# Patient Record
Sex: FEMALE | Race: WHITE | Employment: UNEMPLOYED | URBAN - METROPOLITAN AREA
[De-identification: names, ages, dates, MRNs, and addresses within clinical notes are randomized per-mention and may not be internally consistent; named-entity substitution may affect disease eponyms.]

---

## 2017-08-24 ENCOUNTER — OFFICE VISIT (OUTPATIENT)
Dept: ORTHOPEDIC SURGERY | Age: 47
End: 2017-08-24

## 2017-08-24 VITALS
BODY MASS INDEX: 25.18 KG/M2 | HEIGHT: 69 IN | WEIGHT: 170 LBS | SYSTOLIC BLOOD PRESSURE: 134 MMHG | HEART RATE: 60 BPM | DIASTOLIC BLOOD PRESSURE: 83 MMHG

## 2017-08-24 DIAGNOSIS — M25.562 LEFT KNEE PAIN, UNSPECIFIED CHRONICITY: Primary | ICD-10-CM

## 2017-08-24 DIAGNOSIS — M17.12 PRIMARY LOCALIZED OSTEOARTHROSIS, LOWER LEG, LEFT: ICD-10-CM

## 2017-08-24 DIAGNOSIS — M25.552 LEFT HIP PAIN: ICD-10-CM

## 2017-08-24 PROCEDURE — 73564 X-RAY EXAM KNEE 4 OR MORE: CPT | Performed by: ORTHOPAEDIC SURGERY

## 2017-08-24 PROCEDURE — 99214 OFFICE O/P EST MOD 30 MIN: CPT | Performed by: ORTHOPAEDIC SURGERY

## 2017-08-24 PROCEDURE — 73502 X-RAY EXAM HIP UNI 2-3 VIEWS: CPT | Performed by: ORTHOPAEDIC SURGERY

## 2020-08-14 ENCOUNTER — OFFICE VISIT (OUTPATIENT)
Dept: ORTHOPEDIC SURGERY | Age: 50
End: 2020-08-14
Payer: COMMERCIAL

## 2020-08-14 VITALS — WEIGHT: 170 LBS | BODY MASS INDEX: 25.18 KG/M2 | HEIGHT: 69 IN

## 2020-08-14 PROCEDURE — L1830 KO IMMOB CANVAS LONG PRE OTS: HCPCS | Performed by: ORTHOPAEDIC SURGERY

## 2020-08-14 PROCEDURE — 99214 OFFICE O/P EST MOD 30 MIN: CPT | Performed by: ORTHOPAEDIC SURGERY

## 2020-08-14 NOTE — LETTER
Attention    These are medical screening labs, not pre-admission surgery labs. Via Tawanda Henriquez M.D.  [phone]  [address], [address] [facility]    Date:  2020    Name: Maryjane Mckee    : 1970    Please take this form with you.       THE FOLLOWING LABS NEED TO BE COMPLETED:    _x__UA  _x__URINE C/S (THIS NEEDS TO BE DONE EVEN IF THE UA IS NORMAL)  _x__CBC W/ DIFF  _x__PT/INR  _x__PTT  _x__TRANSFERRIN  _x__ALBUMIN  _x__CHEM 7  _x__HEMAGLOBIN A1-C  ____OTHER: _____________________________________________                         Diagnosis:  LT KNEE OSTEOARTHRITIS            RT KNEE OA M17.11      LT KNEE OA M17.12         RT HIP OA  M16.11         LT HIP OA M16.12         RT SHLD OA  M19.011   LT SHLD OA  M19.012             Z01.812  (Pre-op examination code)      2020 11:57 AM  Sharlene Quinn PA-C

## 2020-08-14 NOTE — PROGRESS NOTES
Grandfather      Social History     Socioeconomic History    Marital status:      Spouse name: None    Number of children: None    Years of education: None    Highest education level: None   Occupational History    None   Social Needs    Financial resource strain: None    Food insecurity     Worry: None     Inability: None    Transportation needs     Medical: None     Non-medical: None   Tobacco Use    Smoking status: Former Smoker     Packs/day: 1.00    Smokeless tobacco: Never Used   Substance and Sexual Activity    Alcohol use: Yes     Alcohol/week: 14.0 standard drinks     Types: 14 Glasses of wine per week     Comment: 2 glasses  wine per day    Drug use: No    Sexual activity: None   Lifestyle    Physical activity     Days per week: None     Minutes per session: None    Stress: None   Relationships    Social connections     Talks on phone: None     Gets together: None     Attends Anabaptism service: None     Active member of club or organization: None     Attends meetings of clubs or organizations: None     Relationship status: None    Intimate partner violence     Fear of current or ex partner: None     Emotionally abused: None     Physically abused: None     Forced sexual activity: None   Other Topics Concern    None   Social History Narrative    None     Current Outpatient Medications   Medication Sig Dispense Refill    amphetamine-dextroamphetamine (ADDERALL, 15MG,) 15 MG tablet Take 15 mg by mouth 2 times daily.  sertraline (ZOLOFT) 50 MG tablet Take 50 mg by mouth daily. No current facility-administered medications for this visit. Medication Management  Patient has been treated with NSAIDs and Steroids with Minimal relief for 3 years. Review of Systems:  Relevant review of systems reviewed and available in the patient's chart    Vital Signs: There were no vitals filed for this visit. Body mass index is 25.1 kg/m².     Limitation in Activities of Daily Living (ADLs)  The patient is able to ambulate with the assistance of cane for 6 - 10 steps  steps/feet. Walking distance less than 1 block    Patient needs assistance with activities of daily living bathing, cooking and work. [facility]: No     Safety Issues: Risk of falls  Patient is at risk for falls/fall history: Yes    General Exam:   Constitutional: Patient is adequately groomed with no evidence of malnutrition  DTRs: Deep tendon reflexes are intact  Mental Status: The patient is oriented to time, place and person. The patient's mood and affect are appropriate. Lymphatic: The lymphatic examination bilaterally reveals all areas to be without enlargement or induration. Vascular: Examination reveals no swelling or calf tenderness. Peripheral pulses are palpable and 2+. Neurological: The patient has good coordination. There is no weakness or sensory deficit. Left knee Examination:    Inspection:  No erythema or signs of infection. Positive knee effusion. There are no cutaneous lesions    Palpation:  There is tenderness to palpation along the lateral joint line. Range of Motion: 5 extension to 100 of active flexion. Crepitation present throughout range of motion    Strength:  4+/5 quadriceps and hamstrings    Special Tests: The knee is stable to varus valgus stressing/anterior posterior drawer. Negative Homans test.                                 Skin: There are no rashes, ulcerations or lesions. Gait: mildly antalgic favoring the right side    Reflex 2+ patellar    Additional Comments:     Examination of the right knee reveals intact skin. There is no focal tenderness. The patient demonstrates full painless range of motion with regard to flexion and extension. Strength is 5/5 throughout all planes. Ligamentous stability is grossly intact. Examination of the right and left hip reveals intact skin.  The patient demonstrates full painless range of motion with regards to flexion, abduction, internal and external rotation. There is no tenderness about the greater trochanter. There is a negative straight leg raise against resistance. Strength is 5/5 throughout all planes. Radiology:   X-rays obtained and reviewed in office:  Views 4  Location AP flexed knee lateral and sunrise views of the left knee:    Impression:   There is advanced lateral and patellofemoral joint space thinning with sclerosis and osteophyte formation present. The medial joint space with mild thinning and osteophyte formation  No fractures are identified. Failed Outpatient management or Previous Surgical Intervention  Patient has undergone conservative treatment of left knee for the past 3 years. Patient has undergone therapy consisting of at least 3 months of physical therapy which included muscle strengthening and flexibility program, cortisone injections, Visco supplementation, different oral medications including NSAIDs and analgesics, efforts at weight loss, and activity modification years with no improvement in  pain relief or function. Impression:  Encounter Diagnosis   Name Primary?  Left knee pain, unspecified chronicity Yes       Office Procedures:  Orders Placed This Encounter   Procedures    XR KNEE LEFT (MIN 4 VIEWS)     Standing Status:   Future     Number of Occurrences:   1     Standing Expiration Date:   8/14/2021       Treatment Plan:  I discussed with the patient the nature of osteoarthritis of the knee. We talked about treatment of arthritis and the various options that are involved with this. The patient understands that the treatments can vary from essentially doing nothing to a total joint replacement arthroplasty for arthritis. I then went on to describe the utilization of glucosamine and chondroitin sulfate as a joint nutrition product. We talked about the fact that this is essentially a joint vitamin with typically minimal side effects.  We also talked about utilization of prescription over-the-counter anti-inflammatory medications as the next option. We also discussed the possibility of brace wear or orthotic wear if the patient has significant varus alignment. We then went on to discuss the possibility of Visco supplementation with hyaluronate products. We talked about the typical course of this type of treatment and the fact that often times in the treatment for significant arthritis, this is successful less than half the time. We also talked about the corticosteroid injections and the fact that this can give a brief window of relief, but does not cure the problem; in fact, the pain often has a rebound effect in 6-10 weeks after the steroid has worn off. We also discussed arthroscopy surgery in attempts to debride the joint, but the fact that this is relatively unreliable treatment in the face of significant arthritis. It can occasionally be used, particularly if there is significant meniscus pathology. Lastly we discussed total joint replacement arthroplasty as the final and definitive step in treatment of arthritis. Patient realizes the magnitude of this type of treatment as well as having voiced a general understanding to the duration of the prosthesis. The patient voiced understanding to these continuum of treatment options. At this point the patient has failed conservative treatment as mentioned above. They would like to go ahead and proceed with a left total knee replacement with robotic assistance. This does require nondiagnostic CT scan for surgical planning. The patient is aware that this may or may not be covered by the insurance provider and would be an out-of-pocket expense. We discussed the risk, benefits, and potential complications of knee replacement arthroplasty surgery. The patient voiced their understanding to concerns that include infection, deep vein thrombosis, neurological injury, and delayed rehabilitation.  The patient also realizes that there are concerns regarding the potential need for manipulation under anesthesia if range of motion proves to be problematic. The patient also understands that there is always a chance of dystrophy and anesthetic complications that would include a stroke, cardiopulmonary pathology, and even death. We also discussed the rehabilitation process involved with this operation and options that involved not only the hospitalization but outpatient physical therapy and independent home exercise program.  The patient also realizes the need for a knee brace and ambulatory aids in the rehabilitation process as well as the very significant role that the patient plays in terms of rehabilitation after this type of operation. The patient also understands that although the patient typically functional by 6-8 weeks postop that it may take 9 months to year for full recovery. All questions were answered. Patient will participate in preoperative lab work and physical therapy. She will utilize Ortho vitals postoperative monitoring.     Demand matching tool completed-advanced

## 2020-08-14 NOTE — LETTER
practice of surgery and medicine is not an exact science, and I acknowledge that no guarantees have been made to me concerning the results of the procedure(s). 5) I consent to the taking of photographs before, during and after the procedure(s) for scientific and educational purposes. I also understand that medical students and residents may participate in the procedure(s) set forth in Paragraph 1, and I consent to their participation under the supervision of the above named physician. 6) I consent to the administration of anesthesia and to the use of such anesthetics as may be deemed advisable by the anesthesiologist engaged to administer anesthesia. 7) I certify that I have read and understand this consent to the surgical or medical procedure(s); that all the information contained herein was disclosed to me by the informing physician prior to my signing; that all blanks or statements requiring insertions or completion were filled in and inapplicable paragraphs, if any, were stricken before I signed; and that all questions asked by me about the procedure(s) have been fully answered by the informing physician in a satisfactory manner.    ________________________________                           _______________________________  Signature of patient                                                                  Hi Guerrero M.D.  ________________________________                           ________________________________  Signature of Informing Physician                                           Informing Physician (Print)    If patient is unable to sign or is a minor, complete one of the following:   A) Patient is a minor ______________ years of age.    B) Patient is unable to sign because_________________________________________________ the PT Evaluation and completed labs has been determined you will be called and set up for surgery. This may take 1-2 days to check results and return your phone call. You will not be called about lab results if everything is normal.      Please make sure you have not blocked our number. [facility] will call from [phone] / [phone]. Also, please make sure your voice mail is not full.

## 2020-08-18 ENCOUNTER — HOSPITAL ENCOUNTER (OUTPATIENT)
Dept: PHYSICAL THERAPY | Age: 50
Setting detail: THERAPIES SERIES
Discharge: HOME | End: 2020-08-18
Payer: COMMERCIAL

## 2020-08-19 ENCOUNTER — HOSPITAL ENCOUNTER (OUTPATIENT)
Dept: PHYSICAL THERAPY | Age: 50
Setting detail: THERAPIES SERIES
Discharge: HOME | End: 2020-08-19
Payer: COMMERCIAL

## 2020-08-19 PROCEDURE — 97110 THERAPEUTIC EXERCISES: CPT | Performed by: PHYSICAL THERAPIST

## 2020-08-19 PROCEDURE — 97161 PT EVAL LOW COMPLEX 20 MIN: CPT | Performed by: PHYSICAL THERAPIST

## 2020-08-19 NOTE — FLOWSHEET NOTE
flexibility, endurance, ROM for improvements in LE, proximal hip, and core control with self care, mobility, lifting, ambulation.  [] (14250) Provided verbal/tactile cueing for activities related to improving balance, coordination, kinesthetic sense, posture, motor skill, proprioception  to assist with LE, proximal hip, and core control in self care, mobility, lifting, ambulation and eccentric single leg control. NMR and Therapeutic Activities:    [] (90423 or 97773) Provided verbal/tactile cueing for activities related to improving balance, coordination, kinesthetic sense, posture, motor skill, proprioception and motor activation to allow for proper function of core, proximal hip and LE with self care and ADLs  [] (11447) Gait Re-education- Provided training and instruction to the patient for proper LE, core and proximal hip recruitment and positioning and eccentric body weight control with ambulation re-education including up and down stairs     Home Exercise Program:    [x] (40803) Reviewed/Progressed HEP activities related to strengthening, flexibility, endurance, ROM of core, proximal hip and LE for functional self-care, mobility, lifting and ambulation/stair navigation   [] (86406)Reviewed/Progressed HEP activities related to improving balance, coordination, kinesthetic sense, posture, motor skill, proprioception of core, proximal hip and LE for self care, mobility, lifting, and ambulation/stair navigation      Manual Treatments:  PROM / STM / Oscillations-Mobs:  G-I, II, III, IV (PA's, Inf., Post.)  [] (16164) Provided manual therapy to mobilize LE, proximal hip and/or LS spine soft tissue/joints for the purpose of modulating pain, promoting relaxation,  increasing ROM, reducing/eliminating soft tissue swelling/inflammation/restriction, improving soft tissue extensibility and allowing for proper ROM for normal function with self care, mobility, lifting and ambulation.      Modalities:      Charges:  Timed Code Treatment Minutes: 20   Total Treatment Minutes: 40     [x] EVAL (LOW) 59792 (typically 20 minutes face-to-face)  [] EVAL (MOD) 56714 (typically 30 minutes face-to-face)  [] EVAL (HIGH) 97777 (typically 45 minutes face-to-face)  [] RE-EVAL     [x] MP(61589) x  1   [] IONTO  [] NMR (44854) x     [] VASO  [] Manual (44162) x      [] Other:  [] TA x      [] Mech Traction (45526)  [] ES(attended) (31433)      [] ES (un) (54610):     GOALS:   Patient stated goal: To be prepared for surgery      Therapist goals for Patient:    Short Term Goals: To be achieved in: 1 weeks  1. Independent in HEP and progression per patient tolerance, in order to prevent re-injury and to prepare for surgery by strength and flexibility therex . Progression Towards Functional goals:  [] Patient is progressing as expected towards functional goals listed. [] Progression is slowed due to complexities listed. [] Progression has been slowed due to co-morbidities.   [x] Plan just implemented, too soon to assess goals progression  [] Other:     ASSESSMENT:  See eval    Treatment/Activity Tolerance:  [x] Patient tolerated treatment well [] Patient limited by fatique  [] Patient limited by pain  [] Patient limited by other medical complications  [] Other:   [] Patient did not tolerate physical therapy activity due to substantial increase in pain    Prognosis: [x] Good [] Fair  [] Poor          Patient Requires Follow-up: [] Yes  [x] No    PLAN: See eval       [] Continue per plan of care  [] Alter current plan (see comments)  [] Plan of care initiated [x] Discharge    [] Discharge to home program at this time due to inability to tolerate physical therapy activity       Electronically signed by: Kwasi Aguilar, PT

## 2020-08-19 NOTE — PLAN OF CARE
[facility], [address], [address] [place] [address]  Phone: [phone]  Fax [phone]     Physical Therapy Certification    Dear Referring Practitioner: Massiel Holland,    We had the pleasure of evaluating the following patient for physical therapy services at [address] [place] [address]. A summary of our findings can be found in the initial assessment below. This includes our plan of care. If you have any questions or concerns regarding these findings, please do not hesitate to contact me at the office phone number checked above. Thank you for the referral.       Physician Signature:_______________________________Date:__________________  By signing above (or electronic signature), therapists plan is approved by physician    Patient: Krista Tsang   : 1970   MRN: [id number]  Referring Physician: Referring Practitioner: Massiel Holland      Evaluation Date: 2020      Medical Diagnosis Information:  Diagnosis: M25.562 (ICD-10-CM) - Left knee pain, unspecified chronicity,M17.12 (ICD-10-CM) - Primary osteoarthritis of left knee   Treatment Diagnosis: M25.562 (ICD-10-CM) - Left knee pain, unspecified chronicity                                         Insurance information: PT Insurance Information:  [employer] - [id number] - NO AUTH       Precautions/ Contra-indications: none    C-SSRS Triggered by Intake questionnaire (Past 2 wk assessment):   [x] No, Questionnaire did not trigger screening.   [] Yes, Patient intake triggered further evaluation      [] C-SSRS Screening completed  [] PCP notified via Plan of Care  [] Emergency services notified     Latex Allergy:  [x]NO      []YES  Preferred Language for Healthcare:   [x]English       []other:    SUBJECTIVE: Patient stated complaint:Pt reports progressive worsening of L >R knee pain that limits her walking and daily activities, causing her to seek out L TKR.   She is here personal factors that will affect rehab potential:              []Age  []Sex              []Motivation/Lack of Motivation                        []Co-Morbidities              []Cognitive Function, education/learning barriers              []Environmental, home barriers              []profession/work barriers  []past PT/medical experience  []other:  Justification:     Falls Risk Assessment (30 days):   [x] Falls Risk assessed and no intervention required. [] Falls Risk assessed and Patient requires intervention due to being higher risk   TUG score (>12s at risk):     [] Falls education provided, including           ASSESSMENT:   Functional Impairments:     []Noted lumbar/proximal hip/LE joint hypomobility   [x]Decreased LE functional ROM   []Decreased core/proximal hip strength and neuromuscular control   [x]Decreased LE functional strength   []Reduced balance/proprioceptive control   []other:      Functional Activity Limitations (from functional questionnaire and intake)   []Reduced ability to tolerate prolonged functional positions   []Reduced ability or difficulty with changes of positions or transfers between positions   []Reduced ability to maintain good posture and demonstrate good body mechanics with sitting, bending, and lifting   []Reduced ability to sleep   [] Reduced ability or tolerance with driving and/or computer work   [x]Reduced ability to perform lifting, carrying tasks   [x]Reduced ability to squat   []Reduced ability to forward bend   [x]Reduced ability to ambulate prolonged functional periods/distances/surfaces   [x]Reduced ability to ascend/descend stairs   [x]Reduced ability to run, hop, cut or jump   []other:    Participation Restrictions   []Reduced participation in self care activities   [x]Reduced participation in home management activities   []Reduced participation in work activities   [x]Reduced participation in social activities.    [x]Reduced participation in sport/recreation activities. Classification :    []Signs/symptoms consistent with post-surgical status including decreased ROM, strength and function. []Signs/symptoms consistent with joint sprain/strain   []Signs/symptoms consistent with patella-femoral syndrome   [x]Signs/symptoms consistent with knee OA/hip OA   []Signs/symptoms consistent with internal derangement of knee/Hip   []Signs/symptoms consistent with functional hip weakness/NMR control      []Signs/symptoms consistent with tendinitis/tendinosis    []signs/symptoms consistent with pathology which may benefit from Dry needling      []other:      Prognosis/Rehab Potential:      []Excellent   [x]Good    []Fair   []Poor    Tolerance of evaluation/treatment:    []Excellent   [x]Good    []Fair   []Poor  Physical Therapy Evaluation Complexity Justification  [x] A history of present problem with:  [] no personal factors and/or comorbidities that impact the plan of care;  [x]1-2 personal factors and/or comorbidities that impact the plan of care  []3 personal factors and/or comorbidities that impact the plan of care  [x] An examination of body systems using standardized tests and measures addressing any of the following: body structures and functions (impairments), activity limitations, and/or participation restrictions;:  [x] a total of 1-2 or more elements   [] a total of 3 or more elements   [] a total of 4 or more elements   [x] A clinical presentation with:  [x] stable and/or uncomplicated characteristics   [] evolving clinical presentation with changing characteristics  [] unstable and unpredictable characteristics;   [x] Clinical decision making of [x] low, [] moderate, [] high complexity using standardized patient assessment instrument and/or measurable assessment of functional outcome.     [x] EVAL (LOW) 94015 (typically 20 minutes face-to-face)  [] EVAL (MOD) 81713 (typically 30 minutes face-to-face)  [] EVAL (HIGH) 16365 (typically 45 minutes face-to-face)  []

## 2020-09-09 DIAGNOSIS — M25.562 LEFT KNEE PAIN, UNSPECIFIED CHRONICITY: Primary | ICD-10-CM

## 2020-09-10 ENCOUNTER — TELEPHONE (OUTPATIENT)
Dept: ORTHOPEDIC SURGERY | Age: 50
End: 2020-09-10

## 2020-09-10 NOTE — TELEPHONE ENCOUNTER
Spoke to patient concerning her preop lab work. She has not had it done yet. When I called her she expressed an interest in rescheduling her case for just 1-2 weeks from her normal scheduled case on 9/22. She has some social obligations that she needs to take care of.

## 2020-09-16 ENCOUNTER — TELEPHONE (OUTPATIENT)
Dept: ORTHOPEDIC SURGERY | Age: 50
End: 2020-09-16

## 2020-09-16 NOTE — TELEPHONE ENCOUNTER
COVID: 9/24/2020- negative     Orthopedic Nurse Navigator Summary  -  Patient Name: Davina Beauchamp  Anticipated Date of Surgery:9/30/2020  Using [employer]? Yes, Are they Registered: Yes  Attended Pre-Op Education Class: No  If No, why not? PCP:  Phone #:  Date of PCP Visit for H&P: 09/14/2020  Any Noted Concerns from PCP prior to surgery: No  If Yes, what concerns?:  Is the Patient in a Pain Management Program?: No  Review of Past Medical History Reveals History of:  -  Critical Lab Values:  - Hemoglobin (g/dL): Date Value  - Hematocrit (%): Date Value  - HgbA1C : Date Value  - Albumin : Date Value  - BUN (mg/dL) : Date Value  - CREATININE (mg/dL) : Date Value  - BMI (kg/m2) : Date Value 25.0  -  Coronary Artery Disease/HTN/CHF History: No  Cardiologist:  Cardiac Clearance Necessary: No  Date of Cardiac Clearance Appt: On Plavix? No  If YES, when will they stop taking? Final Cardiac Recommendations: On any anticoagulation: No  -  Diabetes History: No  Most Recent HgbA1C:  PCP or Endocrine Recommendations:  Nutritionist/Dietician Consult Scheduled:  Final Plan For Diabetic Control:  Pulmonary: COPD/Emphysema/ Use of home oxygen: NONE  Alcohol use: Casual Drinker  -  DVT Risk Stratification: Low Risk  Vascular Consult Ordered:  Date of Vascular Appt:  Hematology/Oncology Consult Ordered:  Date of Hematology/Oncology Appt:  Final Recommendation For DVT Prophylaxis:  Smoking history: Non-Smoker  Use of Estrogen:  -  BMI Greater than 40 at time of scheduling?: No  Has Surgeon been notified of BMI concern? No  Weight Loss Clinic Consult Ordered No  Date of Wt Loss Clinic Appt:  BMI at time of surgery (if went through [facility]):  -  Additional Medical Concerns:   Additional Recommendations for above concerns:  Attended Pre-Hab Program: Yes  Anticipated Discharge Disposition: Home with OPT  Who will be with patient at home following discharge?   Equipment patient already has: none  Bedroom on first or second floor: first  Bathroom on first or second floor: first  Weight bearing status: full  Pre-op ambulatory status:  Number of entry steps: FIVE  Caregiver assistance: full time  [place] [facility]  09/23/2020

## 2020-09-23 ENCOUNTER — HOSPITAL ENCOUNTER (OUTPATIENT)
Age: 50
Discharge: HOME | End: 2020-09-23
Payer: COMMERCIAL

## 2020-09-23 LAB
ALBUMIN SERPL-MCNC: 4.7 G/DL (ref 3.4–5)
ANION GAP SERPL CALCULATED.3IONS-SCNC: 10 MMOL/L (ref 3–16)
APTT: 28.7 SEC (ref 24.2–36.2)
BASOPHILS ABSOLUTE: 0 K/UL (ref 0–0.2)
BASOPHILS RELATIVE PERCENT: 0.5 %
BILIRUBIN URINE: NEGATIVE
BLOOD, URINE: NEGATIVE
BUN BLDV-MCNC: 18 MG/DL (ref 7–20)
CALCIUM SERPL-MCNC: 9.8 MG/DL (ref 8.3–10.6)
CHLORIDE BLD-SCNC: 98 MMOL/L (ref 99–110)
CLARITY: CLEAR
CO2: 29 MMOL/L (ref 21–32)
COLOR: YELLOW
CREAT SERPL-MCNC: 0.7 MG/DL (ref 0.6–1.1)
EOSINOPHILS ABSOLUTE: 0.1 K/UL (ref 0–0.6)
EOSINOPHILS RELATIVE PERCENT: 1.6 %
GFR AFRICAN AMERICAN: >60
GFR NON-AFRICAN AMERICAN: >60
GLUCOSE BLD-MCNC: 80 MG/DL (ref 70–99)
GLUCOSE URINE: NEGATIVE MG/DL
HCT VFR BLD CALC: 40.8 % (ref 36–48)
HEMOGLOBIN: 14 G/DL (ref 12–16)
INR BLD: 0.91 (ref 0.86–1.14)
KETONES, URINE: NEGATIVE MG/DL
LEUKOCYTE ESTERASE, URINE: NEGATIVE
LYMPHOCYTES ABSOLUTE: 0.8 K/UL (ref 1–5.1)
LYMPHOCYTES RELATIVE PERCENT: 25.4 %
MCH RBC QN AUTO: 33.2 PG (ref 26–34)
MCHC RBC AUTO-ENTMCNC: 34.2 G/DL (ref 31–36)
MCV RBC AUTO: 96.9 FL (ref 80–100)
MICROSCOPIC EXAMINATION: NORMAL
MONOCYTES ABSOLUTE: 0.3 K/UL (ref 0–1.3)
MONOCYTES RELATIVE PERCENT: 9 %
NEUTROPHILS ABSOLUTE: 2 K/UL (ref 1.7–7.7)
NEUTROPHILS RELATIVE PERCENT: 63.5 %
NITRITE, URINE: NEGATIVE
PDW BLD-RTO: 12.6 % (ref 12.4–15.4)
PH UA: 6 (ref 5–8)
PLATELET # BLD: 185 K/UL (ref 135–450)
PMV BLD AUTO: 7.9 FL (ref 5–10.5)
POTASSIUM SERPL-SCNC: 4.2 MMOL/L (ref 3.5–5.1)
PROTEIN UA: NEGATIVE MG/DL
PROTHROMBIN TIME: 10.5 SEC (ref 10–13.2)
RBC # BLD: 4.22 M/UL (ref 4–5.2)
SODIUM BLD-SCNC: 137 MMOL/L (ref 136–145)
SPECIFIC GRAVITY UA: 1.02 (ref 1–1.03)
SPECIMEN STATUS: NORMAL
TRANSFERRIN: 320 MG/DL (ref 200–360)
URINE TYPE: NORMAL
UROBILINOGEN, URINE: 0.2 E.U./DL
WBC # BLD: 3.2 K/UL (ref 4–11)

## 2020-09-23 PROCEDURE — 84466 ASSAY OF TRANSFERRIN: CPT

## 2020-09-23 PROCEDURE — 85025 COMPLETE CBC W/AUTO DIFF WBC: CPT

## 2020-09-23 PROCEDURE — 82040 ASSAY OF SERUM ALBUMIN: CPT

## 2020-09-23 PROCEDURE — 87086 URINE CULTURE/COLONY COUNT: CPT

## 2020-09-23 PROCEDURE — 36415 COLL VENOUS BLD VENIPUNCTURE: CPT

## 2020-09-23 PROCEDURE — 83036 HEMOGLOBIN GLYCOSYLATED A1C: CPT

## 2020-09-23 PROCEDURE — 87186 SC STD MICRODIL/AGAR DIL: CPT

## 2020-09-23 PROCEDURE — 87077 CULTURE AEROBIC IDENTIFY: CPT

## 2020-09-23 PROCEDURE — 80048 BASIC METABOLIC PNL TOTAL CA: CPT

## 2020-09-23 PROCEDURE — 85610 PROTHROMBIN TIME: CPT

## 2020-09-23 PROCEDURE — 85730 THROMBOPLASTIN TIME PARTIAL: CPT

## 2020-09-23 PROCEDURE — 81003 URINALYSIS AUTO W/O SCOPE: CPT

## 2020-09-23 RX ORDER — BUPROPION HYDROCHLORIDE 300 MG/1
300 TABLET ORAL EVERY MORNING
COMMUNITY

## 2020-09-23 NOTE — PROGRESS NOTES
Obstructive Sleep Apnea (ANATOLY) Screening     Patient:  Kenan Rivera    YOB: 1970      Medical Record #:  [id number]                     Date:  9/23/2020     1. Are you a loud and/or regular snorer? []  Yes       [x] No    2. Have you been observed to gasp or stop breathing during sleep? []  Yes       [x] No    3. Do you feel tired or groggy upon awakening or do you awaken with a headache?           []  Yes       [x] No    4. Are you often tired or fatigued during the wake time hours? []  Yes       [] No    5. Do you fall asleep sitting, reading, watching TV or driving? []  Yes       [] No    6. Do you often have problems with memory or concentration? []  Yes       [] No    **If patient's score is ? 3 they are considered high risk for ANATOLY. An Anesthesia provider will evaluate the patient and develop a plan of care the day of surgery. Note:  If the patient's BMI is more than 35 kg m¯² , has neck circumference > 40 cm, and/or high blood pressure the risk is greater (© American [employer] Apnea Association, 2006).

## 2020-09-23 NOTE — PROGRESS NOTES
Preoperative Screening for Elective Surgery/Invasive Procedures While COVID-19 present in the community     Have you had any of the following symptoms? o Fever, chills  o Cough  o Shortness of breath  o Muscle aches/pain  o Diarrhea  o Abdominal pain, nausea, vomiting  o Loss or decrease in taste and / or smell   Risk of Exposure  o Have you recently been hospitalized for COVID-19 or flu-like illness, if so when?  o Recently diagnosed with COVID-19, if so when?  o Recently tested for COVID-19, if so when?  o Have you been in close contact with a person or family member who currently has or recently had COVID-19? If yes, when and in what context?  o Do you live with anybody who in the last 14 days has had fever, chills, shortness of breath, muscle aches, flu-like illness?  o Do you have any close contacts or family members who are currently in the hospital for COVID-19 or flu-like illness? If yes, assess recent close contact with this person. Indicate if the patient has a positive screen by answering yes to one or more of the above questions. Patients who test positive or screen positive prior to surgery or on the day of surgery should be evaluated in conjunction with the surgeon/proceduralist/anesthesiologist to determine the urgency of the procedure. No to all questions.  Covid test 9/24

## 2020-09-24 ENCOUNTER — TELEPHONE (OUTPATIENT)
Dept: ORTHOPEDIC SURGERY | Age: 50
End: 2020-09-24

## 2020-09-24 ENCOUNTER — OFFICE VISIT (OUTPATIENT)
Dept: PRIMARY CARE CLINIC | Age: 50
End: 2020-09-24

## 2020-09-24 LAB
ESTIMATED AVERAGE GLUCOSE: 93.9 MG/DL
HBA1C MFR BLD: 4.9 %

## 2020-09-24 NOTE — PATIENT INSTRUCTIONS
Guerita Lizarraga received a viral test for COVID-19. They were educated on isolation and quarantine as appropriate. For any symptoms, they were directed to seek care from their PCP, given contact information to establish with a doctor, directed to an urgent care or the emergency room.

## 2020-09-25 ENCOUNTER — ANESTHESIA EVENT (OUTPATIENT)
Dept: OPERATING ROOM | Age: 50
End: 2020-09-25
Payer: COMMERCIAL

## 2020-09-25 ENCOUNTER — TELEPHONE (OUTPATIENT)
Dept: ORTHOPEDIC SURGERY | Age: 50
End: 2020-09-25

## 2020-09-25 LAB
ORGANISM: ABNORMAL
URINE CULTURE, ROUTINE: ABNORMAL

## 2020-09-25 RX ORDER — CELECOXIB 100 MG/1
100 CAPSULE ORAL 2 TIMES DAILY
Status: CANCELLED | OUTPATIENT
Start: 2020-09-25

## 2020-09-25 RX ORDER — GABAPENTIN 300 MG/1
300 CAPSULE ORAL 3 TIMES DAILY
Status: CANCELLED | OUTPATIENT
Start: 2020-09-25

## 2020-09-26 LAB — SARS-COV-2, NAA: NOT DETECTED

## 2020-09-28 RX ORDER — SULFAMETHOXAZOLE AND TRIMETHOPRIM 800; 160 MG/1; MG/1
1 TABLET ORAL 2 TIMES DAILY
Qty: 20 TABLET | Refills: 0 | Status: SHIPPED | OUTPATIENT
Start: 2020-09-28 | End: 2020-10-08

## 2020-09-29 NOTE — ANESTHESIA PRE PROCEDURE
Department of Anesthesiology  Preprocedure Note       Name:  Nimesh Sierra   Age:  48 y.o.  :  1970                                          MRN:  [id number]         Date:  2020      Surgeon: Faith Calhoun):  Tiana Andersen MD    Procedure: Procedure(s):  LEFT TOTAL KNEE MAKOPLASTY REPLACEMENT WITH ADDUCTOR CANAL BLOCK FOR PAIN CONTROL       [place]  CPT CODE - 61385    Medications prior to admission:   Prior to Admission medications    Medication Sig Start Date End Date Taking? Authorizing Provider   buPROPion (WELLBUTRIN XL) 300 MG extended release tablet Take 300 mg by mouth every morning   Yes Historical Provider, MD   amphetamine-dextroamphetamine (ADDERALL, 15MG,) 15 MG tablet Take 30 mg by mouth daily. 12/13/10  Yes Historical Provider, MD   sertraline (ZOLOFT) 50 MG tablet Take 50 mg by mouth daily. Yes Historical Provider, MD   sulfamethoxazole-trimethoprim (BACTRIM DS;SEPTRA DS) 800-160 MG per tablet Take 1 tablet by mouth 2 times daily for 10 days 9/28/20 10/8/20  CLYDE Valdes-KAY   mupirocin (BACTROBAN) 2 % ointment 1 22 gram tube. Apply 1 cm (small drop) to a q-tip and swab the inside of each nostril twice a day starting 5 days prior to surgery. 20   CLYDE White       Current medications:    No current facility-administered medications for this encounter. Current Outpatient Medications   Medication Sig Dispense Refill    buPROPion (WELLBUTRIN XL) 300 MG extended release tablet Take 300 mg by mouth every morning      amphetamine-dextroamphetamine (ADDERALL, 15MG,) 15 MG tablet Take 30 mg by mouth daily.  sertraline (ZOLOFT) 50 MG tablet Take 50 mg by mouth daily.  sulfamethoxazole-trimethoprim (BACTRIM DS;SEPTRA DS) 800-160 MG per tablet Take 1 tablet by mouth 2 times daily for 10 days 20 tablet 0    mupirocin (BACTROBAN) 2 % ointment 1 22 gram tube.  Apply 1 cm (small drop) to a q-tip and swab the inside of each nostril twice a day starting 5 days prior to GFRAA >60 09/23/2020    LABGLOM >60 09/23/2020    GLUCOSE 80 09/23/2020    CALCIUM 9.8 09/23/2020       POC Tests: No results for input(s): POCGLU, POCNA, POCK, POCCL, POCBUN, POCHEMO, POCHCT in the last 72 hours. Coags:   Lab Results   Component Value Date    PROTIME 10.5 09/23/2020    INR 0.91 09/23/2020    APTT 28.7 09/23/2020       HCG (If Applicable): No results found for: PREGTESTUR, PREGSERUM, HCG, HCGQUANT     ABGs: No results found for: PHART, PO2ART, ZHG7BIA, BSS2BFB, BEART, T0BUPYHN     Type & Screen (If Applicable):  No results found for: LABABO, LABRH    Drug/Infectious Status (If Applicable):  No results found for: HIV, HEPCAB    COVID-19 Screening (If Applicable):   Lab Results   Component Value Date    COVID19 NOT DETECTED 09/24/2020         Anesthesia Evaluation  Patient summary reviewed and Nursing notes reviewed no history of anesthetic complications:   Airway: Mallampati: II     Neck ROM: full   Dental:          Pulmonary:Negative Pulmonary ROS and normal exam                               Cardiovascular:Negative CV ROS                      Neuro/Psych:   Negative Neuro/Psych ROS  (+) psychiatric history:depression/anxiety             GI/Hepatic/Renal: Neg GI/Hepatic/Renal ROS       (-) hiatal hernia and GERD       Endo/Other: Negative Endo/Other ROS   (+) : arthritis:., .                 Abdominal:           Vascular:                                        Anesthesia Plan      general     ASA 2     (I discussed with the patient the risks and benefits of regional anesthesia (inlcuding infection, bleeding, damage to nerves and surrounding structures)  PIV, ACNB, SAB, General anesthesia, IV Narcotics, PACU. All questions were answered the patient agrees with the plan and wishes to proceed.)  Induction: intravenous. Pre-Operative Diagnosis: Primary osteoarthritis of left knee [M17.12]; Primary localized osteoarthritis of left knee [M17.12]    48 y.o.   BMI:  Body mass index

## 2020-09-30 ENCOUNTER — ANESTHESIA (OUTPATIENT)
Dept: OPERATING ROOM | Age: 50
End: 2020-09-30
Payer: COMMERCIAL

## 2020-09-30 ENCOUNTER — HOSPITAL ENCOUNTER (OUTPATIENT)
Age: 50
Discharge: HOME | End: 2020-09-30
Attending: ORTHOPAEDIC SURGERY | Admitting: ORTHOPAEDIC SURGERY
Payer: COMMERCIAL

## 2020-09-30 VITALS
TEMPERATURE: 98 F | WEIGHT: 173.3 LBS | HEART RATE: 67 BPM | BODY MASS INDEX: 25.67 KG/M2 | DIASTOLIC BLOOD PRESSURE: 74 MMHG | OXYGEN SATURATION: 98 % | SYSTOLIC BLOOD PRESSURE: 117 MMHG | HEIGHT: 69 IN | RESPIRATION RATE: 16 BRPM

## 2020-09-30 VITALS
RESPIRATION RATE: 1 BRPM | SYSTOLIC BLOOD PRESSURE: 98 MMHG | OXYGEN SATURATION: 100 % | TEMPERATURE: 98.6 F | DIASTOLIC BLOOD PRESSURE: 62 MMHG

## 2020-09-30 PROBLEM — M17.12 PRIMARY LOCALIZED OSTEOARTHRITIS OF LEFT KNEE: Status: ACTIVE | Noted: 2020-09-30

## 2020-09-30 LAB
ABO/RH: NORMAL
ANTIBODY SCREEN: NORMAL
EKG ATRIAL RATE: 55 BPM
EKG DIAGNOSIS: NORMAL
EKG P AXIS: 53 DEGREES
EKG P-R INTERVAL: 128 MS
EKG Q-T INTERVAL: 450 MS
EKG QRS DURATION: 110 MS
EKG QTC CALCULATION (BAZETT): 430 MS
EKG R AXIS: 46 DEGREES
EKG T AXIS: -13 DEGREES
EKG VENTRICULAR RATE: 55 BPM
PREGNANCY, URINE: NEGATIVE

## 2020-09-30 PROCEDURE — 6360000002 HC RX W HCPCS: Performed by: NURSE ANESTHETIST, CERTIFIED REGISTERED

## 2020-09-30 PROCEDURE — 2580000003 HC RX 258: Performed by: ORTHOPAEDIC SURGERY

## 2020-09-30 PROCEDURE — 86850 RBC ANTIBODY SCREEN: CPT

## 2020-09-30 PROCEDURE — 7100000011 HC PHASE II RECOVERY - ADDTL 15 MIN: Performed by: ORTHOPAEDIC SURGERY

## 2020-09-30 PROCEDURE — 76942 ECHO GUIDE FOR BIOPSY: CPT | Performed by: ANESTHESIOLOGY

## 2020-09-30 PROCEDURE — 84703 CHORIONIC GONADOTROPIN ASSAY: CPT

## 2020-09-30 PROCEDURE — 3600000015 HC SURGERY LEVEL 5 ADDTL 15MIN: Performed by: ORTHOPAEDIC SURGERY

## 2020-09-30 PROCEDURE — 2500000003 HC RX 250 WO HCPCS: Performed by: NURSE ANESTHETIST, CERTIFIED REGISTERED

## 2020-09-30 PROCEDURE — 2720000010 HC SURG SUPPLY STERILE: Performed by: ORTHOPAEDIC SURGERY

## 2020-09-30 PROCEDURE — 6360000002 HC RX W HCPCS: Performed by: PHYSICIAN ASSISTANT

## 2020-09-30 PROCEDURE — 6370000000 HC RX 637 (ALT 250 FOR IP): Performed by: ANESTHESIOLOGY

## 2020-09-30 PROCEDURE — 2580000003 HC RX 258: Performed by: ANESTHESIOLOGY

## 2020-09-30 PROCEDURE — 64447 NJX AA&/STRD FEMORAL NRV IMG: CPT | Performed by: ANESTHESIOLOGY

## 2020-09-30 PROCEDURE — 3600000005 HC SURGERY LEVEL 5 BASE: Performed by: ORTHOPAEDIC SURGERY

## 2020-09-30 PROCEDURE — 88311 DECALCIFY TISSUE: CPT

## 2020-09-30 PROCEDURE — 6360000002 HC RX W HCPCS: Performed by: ORTHOPAEDIC SURGERY

## 2020-09-30 PROCEDURE — 97116 GAIT TRAINING THERAPY: CPT

## 2020-09-30 PROCEDURE — 7100000000 HC PACU RECOVERY - FIRST 15 MIN: Performed by: ORTHOPAEDIC SURGERY

## 2020-09-30 PROCEDURE — 88305 TISSUE EXAM BY PATHOLOGIST: CPT

## 2020-09-30 PROCEDURE — 93005 ELECTROCARDIOGRAM TRACING: CPT | Performed by: ANESTHESIOLOGY

## 2020-09-30 PROCEDURE — 97161 PT EVAL LOW COMPLEX 20 MIN: CPT

## 2020-09-30 PROCEDURE — C9290 INJ, BUPIVACAINE LIPOSOME: HCPCS | Performed by: ORTHOPAEDIC SURGERY

## 2020-09-30 PROCEDURE — 3700000000 HC ANESTHESIA ATTENDED CARE: Performed by: ORTHOPAEDIC SURGERY

## 2020-09-30 PROCEDURE — 86901 BLOOD TYPING SEROLOGIC RH(D): CPT

## 2020-09-30 PROCEDURE — 2500000003 HC RX 250 WO HCPCS: Performed by: ORTHOPAEDIC SURGERY

## 2020-09-30 PROCEDURE — C1776 JOINT DEVICE (IMPLANTABLE): HCPCS | Performed by: ORTHOPAEDIC SURGERY

## 2020-09-30 PROCEDURE — 97165 OT EVAL LOW COMPLEX 30 MIN: CPT

## 2020-09-30 PROCEDURE — 2709999900 HC NON-CHARGEABLE SUPPLY: Performed by: ORTHOPAEDIC SURGERY

## 2020-09-30 PROCEDURE — 7100000010 HC PHASE II RECOVERY - FIRST 15 MIN: Performed by: ORTHOPAEDIC SURGERY

## 2020-09-30 PROCEDURE — 7100000001 HC PACU RECOVERY - ADDTL 15 MIN: Performed by: ORTHOPAEDIC SURGERY

## 2020-09-30 PROCEDURE — 3700000001 HC ADD 15 MINUTES (ANESTHESIA): Performed by: ORTHOPAEDIC SURGERY

## 2020-09-30 PROCEDURE — 86900 BLOOD TYPING SEROLOGIC ABO: CPT

## 2020-09-30 PROCEDURE — 97535 SELF CARE MNGMENT TRAINING: CPT

## 2020-09-30 DEVICE — IMPLANTABLE DEVICE: Type: IMPLANTABLE DEVICE | Status: FUNCTIONAL

## 2020-09-30 DEVICE — INSERT TIB CS 5 10 MM ARTC POST KNEE BEAR TECHNOLOGY X3: Type: IMPLANTABLE DEVICE | Status: FUNCTIONAL

## 2020-09-30 DEVICE — Z DUP USE 2373673 TRITANIUM SYMMETRIC METAL BACKED PATELLA S36X10: Type: IMPLANTABLE DEVICE | Status: FUNCTIONAL

## 2020-09-30 DEVICE — BASEPLATE TIB SZ 5 AP49MM ML74MM KNEE TRITANIUM 4 CRUCFRM: Type: IMPLANTABLE DEVICE | Status: FUNCTIONAL

## 2020-09-30 RX ORDER — PROPOFOL 10 MG/ML
INJECTION, EMULSION INTRAVENOUS CONTINUOUS PRN
Status: DISCONTINUED | OUTPATIENT
Start: 2020-09-30 | End: 2020-09-30 | Stop reason: SDUPTHER

## 2020-09-30 RX ORDER — MORPHINE SULFATE 2 MG/ML
2 INJECTION, SOLUTION INTRAMUSCULAR; INTRAVENOUS
Status: CANCELLED | OUTPATIENT
Start: 2020-09-30

## 2020-09-30 RX ORDER — LIDOCAINE HYDROCHLORIDE 20 MG/ML
INJECTION, SOLUTION INFILTRATION; PERINEURAL PRN
Status: DISCONTINUED | OUTPATIENT
Start: 2020-09-30 | End: 2020-09-30 | Stop reason: SDUPTHER

## 2020-09-30 RX ORDER — CYCLOBENZAPRINE HCL 10 MG
10 TABLET ORAL 3 TIMES DAILY PRN
Qty: 30 TABLET | Refills: 0 | Status: SHIPPED | OUTPATIENT
Start: 2020-09-30 | End: 2020-10-10

## 2020-09-30 RX ORDER — TRANEXAMIC ACID 100 MG/ML
INJECTION, SOLUTION INTRAVENOUS PRN
Status: DISCONTINUED | OUTPATIENT
Start: 2020-09-30 | End: 2020-09-30 | Stop reason: SDUPTHER

## 2020-09-30 RX ORDER — BUPIVACAINE HYDROCHLORIDE 7.5 MG/ML
INJECTION, SOLUTION INTRASPINAL PRN
Status: DISCONTINUED | OUTPATIENT
Start: 2020-09-30 | End: 2020-09-30 | Stop reason: SDUPTHER

## 2020-09-30 RX ORDER — SODIUM CHLORIDE 0.9 % (FLUSH) 0.9 %
10 SYRINGE (ML) INJECTION EVERY 12 HOURS SCHEDULED
Status: CANCELLED | OUTPATIENT
Start: 2020-09-30

## 2020-09-30 RX ORDER — FENTANYL CITRATE 50 UG/ML
INJECTION, SOLUTION INTRAMUSCULAR; INTRAVENOUS PRN
Status: DISCONTINUED | OUTPATIENT
Start: 2020-09-30 | End: 2020-09-30 | Stop reason: SDUPTHER

## 2020-09-30 RX ORDER — WATER 1000 ML/1000ML
INJECTION, SOLUTION INTRAVENOUS PRN
Status: DISCONTINUED | OUTPATIENT
Start: 2020-09-30 | End: 2020-09-30 | Stop reason: ALTCHOICE

## 2020-09-30 RX ORDER — ASPIRIN 325 MG
325 TABLET, DELAYED RELEASE (ENTERIC COATED) ORAL 2 TIMES DAILY
Qty: 60 TABLET | Refills: 0 | Status: SHIPPED | OUTPATIENT
Start: 2020-10-01

## 2020-09-30 RX ORDER — EPHEDRINE SULFATE 50 MG/ML
INJECTION INTRAVENOUS PRN
Status: DISCONTINUED | OUTPATIENT
Start: 2020-09-30 | End: 2020-09-30 | Stop reason: SDUPTHER

## 2020-09-30 RX ORDER — METHYLPREDNISOLONE 4 MG/1
TABLET ORAL
Qty: 1 KIT | Refills: 0 | Status: SHIPPED | OUTPATIENT
Start: 2020-09-30 | End: 2020-10-06

## 2020-09-30 RX ORDER — GABAPENTIN 300 MG/1
300 CAPSULE ORAL ONCE
Status: COMPLETED | OUTPATIENT
Start: 2020-09-30 | End: 2020-09-30

## 2020-09-30 RX ORDER — OXYCODONE HYDROCHLORIDE 5 MG/1
5 TABLET ORAL EVERY 4 HOURS PRN
Status: CANCELLED | OUTPATIENT
Start: 2020-09-30

## 2020-09-30 RX ORDER — SODIUM CHLORIDE 0.9 % (FLUSH) 0.9 %
10 SYRINGE (ML) INJECTION PRN
Status: CANCELLED | OUTPATIENT
Start: 2020-09-30

## 2020-09-30 RX ORDER — LIDOCAINE HYDROCHLORIDE 10 MG/ML
INJECTION, SOLUTION EPIDURAL; INFILTRATION; INTRACAUDAL; PERINEURAL PRN
Status: DISCONTINUED | OUTPATIENT
Start: 2020-09-30 | End: 2020-09-30 | Stop reason: SDUPTHER

## 2020-09-30 RX ORDER — PROPOFOL 10 MG/ML
INJECTION, EMULSION INTRAVENOUS PRN
Status: DISCONTINUED | OUTPATIENT
Start: 2020-09-30 | End: 2020-09-30 | Stop reason: SDUPTHER

## 2020-09-30 RX ORDER — SODIUM CHLORIDE, SODIUM LACTATE, POTASSIUM CHLORIDE, CALCIUM CHLORIDE 600; 310; 30; 20 MG/100ML; MG/100ML; MG/100ML; MG/100ML
INJECTION, SOLUTION INTRAVENOUS CONTINUOUS
Status: DISCONTINUED | OUTPATIENT
Start: 2020-09-30 | End: 2020-09-30 | Stop reason: HOSPADM

## 2020-09-30 RX ORDER — DEXAMETHASONE SODIUM PHOSPHATE 10 MG/ML
INJECTION INTRAMUSCULAR; INTRAVENOUS PRN
Status: DISCONTINUED | OUTPATIENT
Start: 2020-09-30 | End: 2020-09-30 | Stop reason: SDUPTHER

## 2020-09-30 RX ORDER — ONDANSETRON 4 MG/1
4 TABLET, FILM COATED ORAL 3 TIMES DAILY PRN
Qty: 30 TABLET | Refills: 0 | Status: SHIPPED | OUTPATIENT
Start: 2020-09-30 | End: 2020-11-03

## 2020-09-30 RX ORDER — GLYCOPYRROLATE 1 MG/5 ML
SYRINGE (ML) INTRAVENOUS PRN
Status: DISCONTINUED | OUTPATIENT
Start: 2020-09-30 | End: 2020-09-30 | Stop reason: SDUPTHER

## 2020-09-30 RX ORDER — ONDANSETRON 8 MG/1
4 TABLET, ORALLY DISINTEGRATING ORAL EVERY 8 HOURS PRN
Status: CANCELLED | OUTPATIENT
Start: 2020-09-30

## 2020-09-30 RX ORDER — MORPHINE SULFATE 4 MG/ML
4 INJECTION, SOLUTION INTRAMUSCULAR; INTRAVENOUS
Status: CANCELLED | OUTPATIENT
Start: 2020-09-30

## 2020-09-30 RX ORDER — PHENYLEPHRINE HCL IN 0.9% NACL 1 MG/10 ML
SYRINGE (ML) INTRAVENOUS PRN
Status: DISCONTINUED | OUTPATIENT
Start: 2020-09-30 | End: 2020-09-30 | Stop reason: SDUPTHER

## 2020-09-30 RX ORDER — MELOXICAM 15 MG/1
15 TABLET ORAL DAILY
Qty: 90 TABLET | Refills: 1 | Status: SHIPPED | OUTPATIENT
Start: 2020-09-30

## 2020-09-30 RX ORDER — CELECOXIB 100 MG/1
200 CAPSULE ORAL ONCE
Status: COMPLETED | OUTPATIENT
Start: 2020-09-30 | End: 2020-09-30

## 2020-09-30 RX ORDER — OXYCODONE HYDROCHLORIDE AND ACETAMINOPHEN 5; 325 MG/1; MG/1
1 TABLET ORAL EVERY 6 HOURS PRN
Qty: 28 TABLET | Refills: 0 | Status: SHIPPED | OUTPATIENT
Start: 2020-09-30 | End: 2020-10-07

## 2020-09-30 RX ORDER — ACETAMINOPHEN 500 MG
1000 TABLET ORAL ONCE
Status: COMPLETED | OUTPATIENT
Start: 2020-09-30 | End: 2020-09-30

## 2020-09-30 RX ORDER — ONDANSETRON 2 MG/ML
4 INJECTION INTRAMUSCULAR; INTRAVENOUS EVERY 6 HOURS PRN
Status: CANCELLED | OUTPATIENT
Start: 2020-09-30

## 2020-09-30 RX ORDER — ACETAMINOPHEN 325 MG/1
650 TABLET ORAL EVERY 6 HOURS
Status: CANCELLED | OUTPATIENT
Start: 2020-09-30

## 2020-09-30 RX ORDER — METHYLPREDNISOLONE ACETATE 40 MG/ML
INJECTION, SUSPENSION INTRA-ARTICULAR; INTRALESIONAL; INTRAMUSCULAR; SOFT TISSUE PRN
Status: DISCONTINUED | OUTPATIENT
Start: 2020-09-30 | End: 2020-09-30 | Stop reason: ALTCHOICE

## 2020-09-30 RX ORDER — SENNA AND DOCUSATE SODIUM 50; 8.6 MG/1; MG/1
1 TABLET, FILM COATED ORAL 2 TIMES DAILY
Status: CANCELLED | OUTPATIENT
Start: 2020-09-30

## 2020-09-30 RX ORDER — SODIUM CHLORIDE, SODIUM LACTATE, POTASSIUM CHLORIDE, CALCIUM CHLORIDE 600; 310; 30; 20 MG/100ML; MG/100ML; MG/100ML; MG/100ML
INJECTION, SOLUTION INTRAVENOUS CONTINUOUS
Status: CANCELLED | OUTPATIENT
Start: 2020-09-30

## 2020-09-30 RX ORDER — VANCOMYCIN HYDROCHLORIDE 1 G/20ML
INJECTION, POWDER, LYOPHILIZED, FOR SOLUTION INTRAVENOUS PRN
Status: DISCONTINUED | OUTPATIENT
Start: 2020-09-30 | End: 2020-09-30 | Stop reason: ALTCHOICE

## 2020-09-30 RX ORDER — OXYCODONE HYDROCHLORIDE 5 MG/1
10 TABLET ORAL EVERY 4 HOURS PRN
Status: CANCELLED | OUTPATIENT
Start: 2020-09-30

## 2020-09-30 RX ORDER — BUPIVACAINE HYDROCHLORIDE 2.5 MG/ML
INJECTION, SOLUTION EPIDURAL; INFILTRATION; INTRACAUDAL PRN
Status: DISCONTINUED | OUTPATIENT
Start: 2020-09-30 | End: 2020-09-30 | Stop reason: ALTCHOICE

## 2020-09-30 RX ORDER — MIDAZOLAM HYDROCHLORIDE 1 MG/ML
INJECTION INTRAMUSCULAR; INTRAVENOUS PRN
Status: DISCONTINUED | OUTPATIENT
Start: 2020-09-30 | End: 2020-09-30 | Stop reason: SDUPTHER

## 2020-09-30 RX ORDER — ONDANSETRON 2 MG/ML
INJECTION INTRAMUSCULAR; INTRAVENOUS PRN
Status: DISCONTINUED | OUTPATIENT
Start: 2020-09-30 | End: 2020-09-30 | Stop reason: SDUPTHER

## 2020-09-30 RX ADMIN — Medication 0.2 MG: at 10:06

## 2020-09-30 RX ADMIN — LIDOCAINE HYDROCHLORIDE 2 ML: 10 INJECTION, SOLUTION EPIDURAL; INFILTRATION; INTRACAUDAL; PERINEURAL at 09:56

## 2020-09-30 RX ADMIN — FENTANYL CITRATE 25 MCG: 50 INJECTION INTRAMUSCULAR; INTRAVENOUS at 09:57

## 2020-09-30 RX ADMIN — CELECOXIB 200 MG: 100 CAPSULE ORAL at 09:10

## 2020-09-30 RX ADMIN — PROPOFOL 50 MG: 10 INJECTION, EMULSION INTRAVENOUS at 10:00

## 2020-09-30 RX ADMIN — SODIUM CHLORIDE, POTASSIUM CHLORIDE, SODIUM LACTATE AND CALCIUM CHLORIDE: 600; 310; 30; 20 INJECTION, SOLUTION INTRAVENOUS at 10:22

## 2020-09-30 RX ADMIN — TRANEXAMIC ACID 1000 MG: 100 INJECTION, SOLUTION INTRAVENOUS at 10:00

## 2020-09-30 RX ADMIN — LIDOCAINE HYDROCHLORIDE 3 ML: 20 INJECTION, SOLUTION INFILTRATION; PERINEURAL at 10:00

## 2020-09-30 RX ADMIN — Medication 0.2 MG: at 10:11

## 2020-09-30 RX ADMIN — Medication 100 MCG: at 10:12

## 2020-09-30 RX ADMIN — GABAPENTIN 300 MG: 300 CAPSULE ORAL at 09:10

## 2020-09-30 RX ADMIN — FENTANYL CITRATE 25 MCG: 50 INJECTION INTRAMUSCULAR; INTRAVENOUS at 10:00

## 2020-09-30 RX ADMIN — DEXAMETHASONE SODIUM PHOSPHATE 10 MG: 10 INJECTION INTRAMUSCULAR; INTRAVENOUS at 10:04

## 2020-09-30 RX ADMIN — BUPIVACAINE HYDROCHLORIDE 1.8 ML: 7.5 INJECTION, SOLUTION SUBARACHNOID at 09:56

## 2020-09-30 RX ADMIN — PROPOFOL 50 MG: 10 INJECTION, EMULSION INTRAVENOUS at 10:31

## 2020-09-30 RX ADMIN — SODIUM CHLORIDE, POTASSIUM CHLORIDE, SODIUM LACTATE AND CALCIUM CHLORIDE: 600; 310; 30; 20 INJECTION, SOLUTION INTRAVENOUS at 09:09

## 2020-09-30 RX ADMIN — EPHEDRINE SULFATE 5 MG: 50 INJECTION INTRAVENOUS at 10:53

## 2020-09-30 RX ADMIN — TRANEXAMIC ACID 1000 MG: 100 INJECTION, SOLUTION INTRAVENOUS at 11:18

## 2020-09-30 RX ADMIN — FENTANYL CITRATE 50 MCG: 50 INJECTION INTRAMUSCULAR; INTRAVENOUS at 09:49

## 2020-09-30 RX ADMIN — CEFAZOLIN SODIUM 2 G: 10 INJECTION, POWDER, FOR SOLUTION INTRAVENOUS at 09:46

## 2020-09-30 RX ADMIN — MIDAZOLAM HYDROCHLORIDE 2 MG: 2 INJECTION, SOLUTION INTRAMUSCULAR; INTRAVENOUS at 09:45

## 2020-09-30 RX ADMIN — EPHEDRINE SULFATE 10 MG: 50 INJECTION INTRAVENOUS at 11:02

## 2020-09-30 RX ADMIN — PROPOFOL 150 MCG/KG/MIN: 10 INJECTION, EMULSION INTRAVENOUS at 10:00

## 2020-09-30 RX ADMIN — ACETAMINOPHEN 1000 MG: 500 TABLET ORAL at 09:10

## 2020-09-30 RX ADMIN — SODIUM CHLORIDE, POTASSIUM CHLORIDE, SODIUM LACTATE AND CALCIUM CHLORIDE: 600; 310; 30; 20 INJECTION, SOLUTION INTRAVENOUS at 11:16

## 2020-09-30 RX ADMIN — Medication 100 MCG: at 11:16

## 2020-09-30 RX ADMIN — SODIUM CHLORIDE, POTASSIUM CHLORIDE, SODIUM LACTATE AND CALCIUM CHLORIDE: 600; 310; 30; 20 INJECTION, SOLUTION INTRAVENOUS at 07:59

## 2020-09-30 RX ADMIN — Medication 100 MCG: at 10:53

## 2020-09-30 RX ADMIN — Medication 200 MCG: at 10:43

## 2020-09-30 RX ADMIN — Medication 100 MCG: at 10:20

## 2020-09-30 RX ADMIN — EPHEDRINE SULFATE 10 MG: 50 INJECTION INTRAVENOUS at 11:16

## 2020-09-30 RX ADMIN — ONDANSETRON 4 MG: 2 INJECTION INTRAMUSCULAR; INTRAVENOUS at 10:04

## 2020-09-30 RX ADMIN — FENTANYL CITRATE 50 MCG: 50 INJECTION INTRAMUSCULAR; INTRAVENOUS at 09:46

## 2020-09-30 RX ADMIN — Medication 100 MCG: at 10:16

## 2020-09-30 RX ADMIN — EPHEDRINE SULFATE 5 MG: 50 INJECTION INTRAVENOUS at 10:33

## 2020-09-30 RX ADMIN — Medication 100 MCG: at 10:30

## 2020-09-30 RX ADMIN — Medication 100 MCG: at 11:02

## 2020-09-30 ASSESSMENT — PULMONARY FUNCTION TESTS
PIF_VALUE: 0
PIF_VALUE: 1
PIF_VALUE: 0
PIF_VALUE: 1
PIF_VALUE: 0
PIF_VALUE: 1
PIF_VALUE: 0
PIF_VALUE: 1
PIF_VALUE: 0
PIF_VALUE: 1
PIF_VALUE: 0

## 2020-09-30 ASSESSMENT — PAIN SCALES - GENERAL
PAINLEVEL_OUTOF10: 0
PAINLEVEL_OUTOF10: 1
PAINLEVEL_OUTOF10: 0
PAINLEVEL_OUTOF10: 0

## 2020-09-30 ASSESSMENT — PAIN DESCRIPTION - PAIN TYPE: TYPE: ACUTE PAIN;SURGICAL PAIN

## 2020-09-30 ASSESSMENT — PAIN DESCRIPTION - ORIENTATION: ORIENTATION: LEFT

## 2020-09-30 ASSESSMENT — PAIN DESCRIPTION - LOCATION: LOCATION: KNEE

## 2020-09-30 NOTE — PROGRESS NOTES
Occupational Therapy   Occupational Therapy Initial Assessment and Treatment Note 1x  Date: 2020   Patient Name: Ethan Raymond  MRN: [id number]     : 1970    Date of Service: 2020    Discharge Recommendations:  Home with assist PRN     Assessment   Assessment: OT eval completed. During OT session, pt was supervision level for functional transfers and bathroom mobility with RW. She was Ind level for basic self-care (toileting/dressing). Spouse will be available to assist as needed. No further OT. Decision Making: Low Complexity  OT Education: OT Role;Plan of Care;Transfer Training;ADL Adaptive Strategies; Family Education;Equipment  Patient Education: disease specific ed:  role of OT, ADL skills, transfers. REQUIRES OT FOLLOW UP: No  Activity Tolerance  Activity Tolerance: Patient Tolerated treatment well  Safety Devices  Safety Devices in place: Yes  Type of devices: Gait belt;Call light within reach; Left in bed;Nurse notified         Patient Diagnosis(es): The primary encounter diagnosis was Primary localized osteoarthritis of left knee. Diagnoses of Primary localized osteoarthrosis of left lower leg and Primary osteoarthritis of left knee were also pertinent to this visit. has a past medical history of ADD (attention deficit disorder with hyperactivity), Anxiety, Arthritis, and Heartburn.   has a past surgical history that includes ureter revision; Knee arthroscopy; Knee arthroscopy (t); and Knee arthroscopy (12/10).        Restrictions  Restrictions/Precautions  Restrictions/Precautions: Weight Bearing  Required Braces or Orthoses?: Yes  Lower Extremity Weight Bearing Restrictions  Left Lower Extremity Weight Bearing: Weight Bearing As Tolerated  Required Braces or Orthoses  Left Lower Extremity Brace: Knee Immobilizer    Subjective   General  Chart Reviewed: Yes  Patient assessed for rehabilitation services?: Yes  Family / Caregiver Present: Yes(spouse)  Referring Practitioner: Casandra Reveles Status: Impaired(numbness in buttocks and tingling LLE)      LUE AROM (degrees)  LUE AROM : WFL  RUE AROM (degrees)  RUE AROM : WFL  LUE Strength  Gross LUE Strength: WFL  RUE Strength  Gross RUE Strength: WFL     Car Transfers  Car Transfers: Pt instructed on safe car transfer technique. She reported understanding of instructions. AM-PAC Score   AM-PAC Inpatient Daily Activity Raw Score: 24 (09/30/20 1457)  AM-PAC Inpatient ADL T-Scale Score : 57.54 (09/30/20 1457)  ADL Inpatient CMS 0-100% Score: 0 (09/30/20 1457)  ADL Inpatient CMS G-Code Modifier : CH (09/30/20 1457)  Goals  Short term goals  Time Frame for Short term goals: 1 week  Short term goal 1: Perform toilet transfer with supervision. goal met 9/30  Short term goal 2: Perform LE dressing independently.goal met 9/30  Short term goal 3: Report understanding of safe car transfer technique. goal met 9/30  Patient Goals   Patient goals :  \"Be able on and off the toilet\"-Goal met 9/30     Therapy Time   Individual Concurrent Group Co-treatment   Time In 1415         Time Out 1434         Minutes 19         Timed Code Treatment Minutes: 9 Minutes(10 min eval)     Darryle Luria OT

## 2020-09-30 NOTE — ANESTHESIA PROCEDURE NOTES
Peripheral Block    Patient location during procedure: pre-op  Start time: 9/30/2020 9:31 AM  End time: 9/30/2020 9:32 AM  Staffing  Anesthesiologist: Bridgett Bernard MD  Performed: anesthesiologist   Preanesthetic Checklist  Completed: patient identified, site marked, surgical consent, pre-op evaluation, timeout performed, IV checked, risks and benefits discussed, monitors and equipment checked, anesthesia consent given, oxygen available and patient being monitored  Peripheral Block  Patient position: supine  Prep: ChloraPrep  Patient monitoring: cardiac monitor, continuous pulse ox, frequent blood pressure checks and IV access  Block type: iPacks  Laterality: left  Injection technique: single-shot  Procedures: ultrasound guided  Local infiltration: lidocaine  Infiltration strength: 1 %  Dose: 3 mL  Provider prep: mask and sterile gloves  Local infiltration: lidocaine  Needle  Needle gauge: 21 G  Needle length: 10 cm  Needle localization: ultrasound guidance  Assessment  Injection assessment: negative aspiration for heme, no paresthesia on injection and local visualized surrounding nerve on ultrasound  Paresthesia pain: none  Slow fractionated injection: yes  Hemodynamics: stable  Additional Notes  Immediately prior to procedure a \"time out\" was called to verify the correct patient, allergies, laterality, procedure and equipment. Time out performed with  RN    Local Anesthetic: 0.5 %  Bupivacaine   Amount: 10 ml  in 5 ml increments after negative aspiration each time.     Versed 5mg    Reason for block: post-op pain management and at surgeon's request

## 2020-09-30 NOTE — OP NOTE
Operative Note      PATIENT NAME Lorelei Lizarraga    Campos Gudino M.D. SURGERY DATE  9/30/2020 11:18 AM    AGE 48 y.o. PATIENT TYPE female    PRE-OPERATIVE DIAGNOSIS: left knee osteoarthritis    POST-OPERATIVE DIAGNOSIS: left knee osteoarthritis    PROCEDURE PERFORMED: left total knee replacement using robotic assistance ([employer] Robot); all three components were press-fit ; posterior cruciate retaining implants. Median parapatellar arthrotomy; with lateral retinacular release. Implants used:  [employer] TRIATHALON TKR SYSTEM WITH:    Size 5 CR Femoral component                          Size 5 primary tibial baseplate                          Size 10 mm X3 polyethylene CS tibial insert                          Size 36 x 10 mm symmetric patellar component    FIRST ASSISTANT: Jessica Osler, PA-C    SECOND ASSISTANT: Ivon Licona MD     ANESTHESIA:spinal with adductor canal nerve block. COMPLICATIONS: None apparent. POST-OP CONDITION:  To recovery room. SPECIMENS: BONE    EBL: < 50 cc    INDICATIONS FOR SURGERY: The patient is a 48y.o.-year-old female with a long history of knee pain affecting the activities of daily living. Pt had severe pain inability to ambulate, night pain and frequent falls. The pain was refractory to conservative management including injections (corticosteroid/viscosupplementation); PT, Ambulatory aids; oral medication (NSAIDs and pain medication) and bracing for 3 - 6 months. Preop workup showed radiographic changes with osteophyte formation; loss of cartilage space; subchondral sclerosis and deformity. The patient is scheduled for a left total knee replacement. The risks, benefits and alternatives of surgery were clearly discussed and the patient wished to proceed with surgery. OPERATIVE FINDINGS: severe osteoarthritis with tricompartmental involvement and varus deformity. Complete loss of articular cartilage, osteophyte formation and severe synovitis.    DETAILS OF PROCEDURE: The patient was brought to the operating room, and after administration of a general anesthetic and femoral nerve block, was placed  on the OR table in the supine position. The lower extremity was prepped and draped in normal sterile fashion. The limb was exsanguinated. Tourniquet was inflated to 350 mmHg. Informed consent was obtained. Operative site was signed. Time out was called. Patient was given prophylactic antibiotics and DVT prophylaxsis. A straight midline incision was made. Median parapatellar arthrotomy was made. The patella was subluxated laterally. The menisci and ACL were resected. The femoral and tibial tracker pins were placed in a bicortical fashion in the proximal tibial and distal femur. The [employer] robot from [employer] was used to input our patient registration and anatomical survey into the computer. Initial kinematic survey showed the patient to have persistent mild varus tracking starting from 10 degrees of extension to approximately 100 degrees of flexion. Preoperative templated sizes and alignment were confirmed preoperatively using a size 5 femoral component, a size 5 tibial tray and a size 10 mm insert. Ligament balancing was then performed using our robotic software. Next, the robotic interactive orthopedic arm was brought into the field. It  was registered. Our tibial cut was first made. Next, our distal femoral cut,  our anterior and posterior chamfer cuts and anterior and posterior cuts were  then made. Our femoral trial and tibial trial were the placed along with an insert. The patella was resurfaced using a symmetric patella component using our reamer system for our  patella. Trials were then placed. The knee was put through a full range of  motion noting full extension and to at least 130 degrees of knee flexion with  balance flexion and extension gap and neutral alignment to approximately 2-3  degrees of varus throughout the entire range of motion.

## 2020-09-30 NOTE — ANESTHESIA PROCEDURE NOTES
Peripheral Block    Patient location during procedure: pre-op  Start time: 9/30/2020 9:33 AM  End time: 9/30/2020 9:34 AM  Staffing  Anesthesiologist: Chaitanya Greene MD  Performed: anesthesiologist   Preanesthetic Checklist  Completed: patient identified, site marked, surgical consent, pre-op evaluation, timeout performed, IV checked, risks and benefits discussed, monitors and equipment checked, anesthesia consent given, oxygen available and patient being monitored  Peripheral Block  Patient position: supine  Prep: ChloraPrep  Patient monitoring: cardiac monitor, continuous pulse ox, frequent blood pressure checks and IV access  Block type: Femoral  Laterality: left  Injection technique: single-shot  Procedures: ultrasound guided  Local infiltration: lidocaine  Infiltration strength: 1 %  Dose: 3 mL  Adductor canal (Low Femoral)  Provider prep: mask and sterile gloves  Local infiltration: lidocaine  Needle  Needle gauge: 21 G  Needle length: 10 cm  Needle localization: ultrasound guidance  Assessment  Injection assessment: negative aspiration for heme, no paresthesia on injection and local visualized surrounding nerve on ultrasound  Paresthesia pain: none  Slow fractionated injection: yes  Hemodynamics: stable  Additional Notes  Immediately prior to procedure a \"time out\" was called to verify the correct patient, allergies, laterality, procedure and equipment. Time out performed with  RN    Local Anesthetic: 0.5 %  Bupivacaine   Amount: 20 ml  in 5 ml increments after negative aspiration each time. Femoral artery (Deep artery to the thigh take off), Femoral Vein and Femoral Nerve are identified; the tip of the need and the spread of the local anesthetic around the Femoral nerve are visualized. The Femoral nerve appeared to be anatomically normal and there were no abnormal pathologically findings seen.      Versed 5mg  Reason for block: post-op pain management and at surgeon's request

## 2020-09-30 NOTE — PROGRESS NOTES
Physical Therapy    Facility/Department: [facility] OR  Initial Assessment, Treatment, and Discharge Summary    NAME: Dian Markham  : 1970  MRN: [id number]    Date of Service: 2020    Discharge Recommendations:  Home with assist PRN, Outpatient PT   PT Equipment Recommendations  Equipment Needed: Yes  Mobility Devices: Therisa Kaur: Rolling    Assessment   Body structures, Functions, Activity limitations: Decreased functional mobility ; Decreased sensation;Decreased ROM; Decreased strength;Decreased endurance  Assessment: Pt is 49 yo female who presents POD 0 s/p left TKA. Pt indep with mobility at baseline. Grossly CGA to SBA for mobility with RW this date including transfers, gait, and curb step. Knee immobilizer discontinued d/t indep LLE SLR x 10 reps with no buckling noted with mobility. Recommend home with assist prn and OP PT. Prognosis: Good  Decision Making: Low Complexity  PT Education: Goals; General Safety;Gait Training;PT Role;Disease Specific Education;Plan of Care; Functional Mobility Training;Home Exercise Program;Precautions;Transfer Training;Energy Conservation;Weight-bearing Education  Patient Education: Pt educated on weight bearing and mobility with RW s/p left TKA -- pt verbalized understanding  Barriers to Learning: none  REQUIRES PT FOLLOW UP: No  Activity Tolerance  Activity Tolerance: Patient Tolerated treatment well       Patient Diagnosis(es): The primary encounter diagnosis was Primary localized osteoarthritis of left knee. Diagnoses of Primary localized osteoarthrosis of left lower leg and Primary osteoarthritis of left knee were also pertinent to this visit. has a past medical history of ADD (attention deficit disorder with hyperactivity), Anxiety, Arthritis, and Heartburn.   has a past surgical history that includes ureter revision; Knee arthroscopy; Knee arthroscopy (t); and Knee arthroscopy (12/10).     Restrictions  Restrictions/Precautions  Restrictions/Precautions: PROM RLE (degrees)  RLE PROM: WFL  AROM LLE (degrees)  LLE General AROM: Hip and ankle WFL; knee ~0-80 degrees  Strength RLE  Strength RLE: WFL  Strength LLE  Comment: indep SLR; hip and ankle WFL     Sensation  Overall Sensation Status: Impaired(numbness in buttocks and tingling LLE)     Bed mobility  Supine to Sit: Modified independent  Sit to Supine: Unable to assess(pt seated EOB at end of session)     Transfers  Sit to Stand: Stand by assistance(x 2 from EOB with cues for hand placement)  Stand to sit: Stand by assistance(cues for hand placement)     Ambulation  Ambulation?: Yes  WB Status: WBAT LLE  Ambulation 1  Surface: level tile  Device: Rolling Walker  Assistance: Stand by assistance;Contact guard assistance  Quality of Gait: Cues for sequencing with RW and to maintain JENY within RW. no LOB. Slight left knee instability but no LOB  Distance: 30 ft x 2  Stairs/Curb  Stairs?: Yes  Stairs  Curbs: 6\"  Device: Rolling walker  Assistance: Contact guard assistance  Comment: CGA to negotiate curb step. Ascending and descending forward. Cues for technique/sequencing, no LOB     Balance  Sitting - Static: Good  Sitting - Dynamic: Good  Standing - Static: Good;-  Standing - Dynamic: Fair;+     Exercises  Straight Leg Raise: x 10 LLE  Quad Sets: x 10 BLE  Heelslides: x 10 LLE supine and seated  Gluteal Sets: x 10 BLE  Knee Short Arc Quad: x 10 LLE  Ankle Pumps: x 10 BLE  Comments: Cues for technique. Handout of TKA protocol HEP given. Plan   Plan  Times per week: d/c acute PT  Safety Devices  Type of devices:  All fall risk precautions in place, Call light within reach, Gait belt, Nurse notified, Left in bed(pt left seated EOB in PACU)       Goals  Short term goals  Time Frame for Short term goals: 1 PT session  Short term goal 1: Pt will be SBA for transfers -- Goal MET 9/30  Short term goal 2: Pt will ambulate 30 ft with RW and CGA -- GOAL MET 9/30  Short term goal 3: Pt will participate in BLE TKA HEP -- GOAL MET 9/30 and handout given  Patient Goals   Patient goals : \"to do a stair without help\" -- GOAL MET 9/30, CGA to negotiate curb step       Therapy Time   Individual Concurrent Group Co-treatment   Time In 1355         Time Out 1415         Minutes 20         Timed Code Treatment Minutes: [address] [facility], PT, DPT

## 2020-09-30 NOTE — ANESTHESIA POSTPROCEDURE EVALUATION
Department of Anesthesiology  Postprocedure Note    Patient: Tomas Wong  MRN: [id number]  YOB: 1970  Date of evaluation: 9/30/2020  Time:  6:38 PM     Procedure Summary     Date:  09/30/20 Room / Location:  [address] [place] [address] [address] / [place]    Anesthesia Start:  3461 Anesthesia Stop:  4286    Procedure:  LEFT TOTAL KNEE MAKOPLASTY REPLACEMENT WITH ADDUCTOR CANAL BLOCK FOR PAIN CONTROL, RIGHT KNEE CORTISONE INJECTION       EVANS AUBREY  CPT CODE - 09398 (Left Knee) Diagnosis:       Primary osteoarthritis of left knee      (OSTEOARTHRITIS LEFT KNEE)    Surgeon:  Trena Cobian MD Responsible Provider:  Lawyer Justin MD    Anesthesia Type:  general ASA Status:  2          Anesthesia Type: general    Jhno Phase I: Jhon Score: 8    Jhon Phase II: Jhon Score: 10    Last vitals: Reviewed and per EMR flowsheets.        Anesthesia Post Evaluation    Comments: Postoperative Anesthesia Note    Name:    Tomas Wong  MRN:      [id number]    Patient Vitals in the past 12 hrs:  09/30/20 1312, BP:117/74, Temp:98 °F (36.7 °C), Pulse:67, Resp:16, SpO2:98 %  09/30/20 0815, BP:127/78, Temp:99 °F (37.2 °C), Temp src:Temporal, Pulse:68, Resp:16, SpO2:100 %, Height:5' 9\" (1.753 m), Weight:173 lb 4.8 oz (78.6 kg)     LABS:    CBC  Lab Results       Component                Value               Date/Time                  WBC                      3.2 (L)             09/23/2020 10:04 AM        HGB                      14.0                09/23/2020 10:04 AM        HCT                      40.8                09/23/2020 10:04 AM        PLT                      185                 09/23/2020 10:04 AM   RENAL  Lab Results       Component                Value               Date/Time                  NA                       137                 09/23/2020 10:04 AM        K                        4.2                 09/23/2020 10:04 AM        CL                       98 (L)              09/23/2020 10:04 AM        CO2

## 2020-10-01 ENCOUNTER — TELEPHONE (OUTPATIENT)
Dept: ORTHOPEDIC SURGERY | Age: 50
End: 2020-10-01

## 2020-10-01 NOTE — TELEPHONE ENCOUNTER
Spoke with pt, doing great. Reviewed all medications with pt. Incision status: No drainage or redness    Edema/Swelling/Teds: Wearing TEDS. Swelling a bit. Has been icing and elevating. Pain level and status: Staying comfortable. Use of pain medications: Using 1 percocet every 6 hours. Blood thinner: ASA- no issues. Bowels: Taking stool softeners, using miralax. Home Care Agency active: NA    Outpatient therapy: Starts on Monday. Do you have all of your medications: Yes    Changes in medications: no    Ortho Vitals: Using fine.      Follow up appointments:    Future Appointments   Date Time Provider Salma White   10/5/2020  9:00 AM Gen Bel, PT [facility]   10/7/2020  9:00 AM Gen Bel, PT [facility]   10/9/2020  9:00 AM Gen Bel, PT [facility]   10/12/2020  9:00 AM Gen Bel, PT [facility]   10/14/2020  9:00 AM Gen Bel, PT [facility]   10/15/2020 10:45 AM JAYME Moon AND [facility]   10/16/2020  9:00 AM Gen Bel, PT [facility]   10/19/2020  9:00 AM Gen Bel, PT [facility]   10/21/2020  9:00 AM Gen Bel, PT [facility]   10/23/2020  9:00 AM Gen Bel, PT [facility]   10/26/2020  9:00 AM Gen Bel, PT [facility]   10/28/2020  9:00 AM Gen Bel, PT [facility]   10/30/2020  9:00 AM Gen Bel, PT [facility]   11/2/2020  9:00 AM Gen Bel, PT [facility] Rein [facility]   11/4/2020  9:00 AM Gen Bel, PT [facility] Ar[facility] Rein [facility]   11/6/2020  9:00 AM Gen Bel, PT [facility] PT Mike Manning

## 2020-10-05 ENCOUNTER — TELEPHONE (OUTPATIENT)
Dept: ORTHOPEDIC SURGERY | Age: 50
End: 2020-10-05

## 2020-10-05 ENCOUNTER — HOSPITAL ENCOUNTER (OUTPATIENT)
Dept: PHYSICAL THERAPY | Age: 50
Setting detail: THERAPIES SERIES
Discharge: HOME | End: 2020-10-05
Payer: COMMERCIAL

## 2020-10-05 PROCEDURE — 97161 PT EVAL LOW COMPLEX 20 MIN: CPT

## 2020-10-05 PROCEDURE — 97110 THERAPEUTIC EXERCISES: CPT

## 2020-10-05 NOTE — PROGRESS NOTES
Re-education                    []? Hot/Coldpack         []? Iontophoresis                       [x]? Instruction in HEP                                      Other:  [x]? Manual Therapy                                                     []?                        []? Aquatic Therapy                                                     []?                      ?                         Frequency/Duration:  # Days per week:       []? 1 day          # Weeks:        []? 1 week        []? 5 weeks                                            [x]? 2 days? []? 2 weeks      [x]? 6 weeks                                      []? 3 days                                 []? 3 weeks      []? 7 weeks                                      []? 4 days                                 []? 4 weeks      []? 8 weeks     Rehab Potential:        []? Excellent     [x]? Good          []? Fair             []? Poor                    Electronically signed by:  Jaguar Lowe PT        If you have any questions or concerns, please don't hesitate to call.   Thank you for your referral.     Physician Signature:________________________________Date:__________________  By signing above, therapists plan is approved by physician

## 2020-10-05 NOTE — FLOWSHEET NOTE
Physical Therapy  . Physical Therapy Daily Treatment Note    Date:  10/5/2020  Patient Name:  Omar Mcknight    :  1970  MRN: [id number]  Restrictions/Precautions:    Medical/Treatment Diagnosis Information:  · Diagnosis: L Knee Replacement  Insurance/Certification information:  PT Insurance Information: BCBS  Physician Information:  Referring Practitioner: Scotty Schumacher MD  Plan of care signed (Y/N):  Y  Visit# / total visits:       G-Code (if applicable):          LEFS: will fill out nv    Time in:   9:10     Timed Treatment: 15 Total Treatment Time:  40 min    ________________________________________________________________________________________    Pain Level:    /10  SUBJECTIVE:  See Initial Evaluation     OBJECTIVE: See Initial Evaluation     Exercise/Equipment Resistance/Repetitions Other comments     Quad set   Glute set 10\"x10  10\"x10 HEP  HEP     SAQ x10 HEP     Heel Slide 5\"x15  HEP     Ankle pumps x30 HEP     Heel prop 3 minutes                                                                                                           Other Therapeutic Activities:  Pt was educated on diagnosis, involved anatomy, POC, established goals, HEP, and facility policies. Manual Treatments:       HEP: [id number]     Modalities:      Test/Measurements:  See Initial Evaluation        ASSESSMENT:  Pt reported that she believes that she pulled stitch. Pt notices increase pain in this area recently and reported she saw a \"small gap\" when changing her bandage. PT recommended pt call her MD to report this issue to determine next step.      Treatment/Activity Tolerance:   [x]Patient tolerated treatment well [] Patient limited by fatique  []Patient limited by pain [] Patient limited by other medical complications  [] Other:     Goals:         Long term goals  Time Frame for Long term goals : 6 weeks  Long term goal 1: Pt will be independent with HEP  Long term goal 2: Pt will improve LLE strength to at least

## 2020-10-05 NOTE — TELEPHONE ENCOUNTER
Have her send a picture through [facility]. Let me know when it is there and I will review it and decide on whether we need to see her in the office or not.

## 2020-10-07 ENCOUNTER — TELEPHONE (OUTPATIENT)
Dept: ORTHOPEDIC SURGERY | Age: 50
End: 2020-10-07

## 2020-10-07 ENCOUNTER — HOSPITAL ENCOUNTER (OUTPATIENT)
Dept: PHYSICAL THERAPY | Age: 50
Setting detail: THERAPIES SERIES
Discharge: HOME | End: 2020-10-07
Payer: COMMERCIAL

## 2020-10-07 PROCEDURE — 97112 NEUROMUSCULAR REEDUCATION: CPT

## 2020-10-07 PROCEDURE — 97110 THERAPEUTIC EXERCISES: CPT

## 2020-10-07 NOTE — TELEPHONE ENCOUNTER
Attempted to contact pt, unable to leave a voicemail with purpose and call back number.     Olgachino Paul  Orthopedic Nurse Navigator  Phone number: [phone]    Follow up appointments:    Future Appointments   Date Time Provider Salma White   10/9/2020  9:00 AM Brant Monk, PT [facility]   10/12/2020  9:00 AM Brant Monk, PT [facility] PT [facility]   10/14/2020  9:00 AM Brant Monk, PT [facility]   10/15/2020 10:45 AM JAYME Evans AND [facility]   10/16/2020  9:00 AM Brant Monk, PT [facility] PT [facility]   10/19/2020  9:00 AM Brant Monk, PT [facility]NA[facility] PT [facility]   10/21/2020  9:00 AM Brant Monk, PT [facility] PT [facility]   10/23/2020  9:00 AM Brant Monk, PT ANAST[facility]IYA PT [facility]   10/26/2020  9:00 AM Brant Monk, PT [facility] PT [facility]   10/28/2020  9:00 AM Brant Monk, PT [facility] PT [facility]   10/30/2020  9:00 AM Brant Monk, PT ANASTASIYA PT [facility]   11/2/2020  9:00 AM Brant Monk, PT [facility] PT [facility]   11/4/2020  9:00 AM Brant Monk, PT [facility] PT [facility]   11/6/2020  9:00 AM Brant Monk, PT [facility] PT [facility]

## 2020-10-07 NOTE — FLOWSHEET NOTE
Physical Therapy  . Physical Therapy Daily Treatment Note    Date:  10/7/2020  Patient Name:  Gwen Patino    :  1970  MRN: [id number]  Restrictions/Precautions:    Medical/Treatment Diagnosis Information:  · Diagnosis: L Knee Replacement  Insurance/Certification information:  PT Insurance Information: BCBS  Physician Information:  Referring Practitioner: Lloyd Cooper MD  Plan of care signed (Y/N):  Y  Visit# / total visits:       G-Code (if applicable):          LEFS: will fill out nv    Time in:   9:00    Timed Treatment: 45 Total Treatment Time:  48 min    ________________________________________________________________________________________    Pain Level:    /10  SUBJECTIVE:  Pt reported that she feels okay today. Pt has noticed she is able to do more things with less difficulty     OBJECTIVE: See Initial Evaluation     Exercise/Equipment Resistance/Repetitions Other comments   Nu-step 5 min To improve      Quad set   Glute set 10\"x15   HEP  HEP     SAQ x15 HEP     Heel Slide 5\"x15  HEP     Ankle pumps  HEP   LAQ x10      Heel prop 3 minutes 2#     SLR Flexion AAROM x8      Triple extension    PKF 5\"x10  x10             Slant board 3x30\"        HR x15       Mini squat 15                                                              Other Therapeutic Activities:  Pt was educated on diagnosis, involved anatomy, POC, established goals, HEP, and facility policies. Manual Treatments:       HEP: [id number]     Modalities:      Test/Measurements:  See Initial Evaluation        ASSESSMENT:  Pt tolerated TE well with minimal increase in LE discomfort. TE and standing TE progressed this date. HEP updated. PT will progress as pt can tolerate.      Treatment/Activity Tolerance:   [x]Patient tolerated treatment well [] Patient limited by fatique  []Patient limited by pain [] Patient limited by other medical complications  [] Other:     Goals:         Long term goals  Time Frame for Long term goals : 6 weeks  Long term goal 1: Pt will be independent with HEP  Long term goal 2: Pt will improve LLE strength to at least 4/5 gross strength  Long term goal 3: Pt will improve L knee ROM to 0-120. Long term goal 4: Pt will ambulate with equal step length and stance time on BLE  Long term goal 5: Pt will improve LEFS by at least 15 in order to demosntrate significant improvement in function     Plan: [] Continue per plan of care [] Alter current plan (see comments)   [x] Plan of care initiated [] Hold pending MD visit [] Discharge      Plan for Next Session:      Re-Certification Due Date:         Signature:   Gila Hartman PT

## 2020-10-09 ENCOUNTER — HOSPITAL ENCOUNTER (OUTPATIENT)
Dept: PHYSICAL THERAPY | Age: 50
Setting detail: THERAPIES SERIES
Discharge: HOME | End: 2020-10-09
Payer: COMMERCIAL

## 2020-10-09 NOTE — PROGRESS NOTES
Physical Therapy        Physical Therapy  Cancellation/No-show Note  Patient Name:  Karlo Rolle  :  1970   Date:  10/9/2020  Cancels to date: 1  No-shows to date: 0    For today's appointment patient:  [x] Cancelled  [x] Rescheduled appointment  [] No-show     Reason given by patient:  [x] Patient ill  [] Conflicting appointment  [] No transportation    [] Conflict with work  [] No reason given  [] Other:     Comments:      Electronically signed by:  Cary Mohr PT

## 2020-10-12 ENCOUNTER — HOSPITAL ENCOUNTER (OUTPATIENT)
Dept: PHYSICAL THERAPY | Age: 50
Setting detail: THERAPIES SERIES
Discharge: HOME | End: 2020-10-12
Payer: COMMERCIAL

## 2020-10-12 PROCEDURE — 97110 THERAPEUTIC EXERCISES: CPT

## 2020-10-12 NOTE — FLOWSHEET NOTE
Physical Therapy  . Physical Therapy Daily Treatment Note    Date:  10/12/2020  Patient Name:  Trudy Lawson    :  1970  MRN: [id number]  Restrictions/Precautions:  Knee replacement on    Medical/Treatment Diagnosis Information:  · Diagnosis: L Knee Replacement  Insurance/Certification information:  PT Insurance Information: [address] [employer] [employer]  Physician Information:  Referring Practitioner: Damian Dumont MD  Plan of care signed (Y/N):  Y  Visit# / total visits:  3/12     G-Code (if applicable):          LEFS: will fill out nv    Time in:   9:05   Timed Treatment: 40 Total Treatment Time:  40 min    ________________________________________________________________________________________    Pain Level:   2 /10  SUBJECTIVE:  Pt reported she feels pretty good today. Pt said she \"over did it a little yesterday\" as she did a lot on her feet yesterday. OBJECTIVE: See Initial Evaluation     Exercise/Equipment Resistance/Repetitions Other comments   Nu-step 5 min To improve      Quad set   Glute set 10\"x15   HEP  HEP     SAQ 3\"x15 HEP     Heel Slide 5\"x15  HEP     Ankle pumps  HEP   LAQ 2\"x15      Heel prop 3 minutes 4#     SLR Flexion AAROM       Triple extension    PKF 5\"x10  x10             Slant board 3x30\"        HR x15       Mini squat 15                                                              Other Therapeutic Activities:  Pt was educated on diagnosis, involved anatomy, POC, established goals, HEP, and facility policies. Manual Treatments:       HEP: [id number]     Modalities:      Test/Measurements:  See Initial Evaluation        ASSESSMENT:  Pt tolerated TE well with minimal increase in LE discomfort. TE and standing TE progressed this date. HEP updated. PT will progress as pt can tolerate.      Treatment/Activity Tolerance:   [x]Patient tolerated treatment well [] Patient limited by fatique  []Patient limited by pain [] Patient limited by other medical complications  [] Other:     Goals: Long term goals  Time Frame for Long term goals : 6 weeks  Long term goal 1: Pt will be independent with HEP  Long term goal 2: Pt will improve LLE strength to at least 4/5 gross strength  Long term goal 3: Pt will improve L knee ROM to 0-120. Long term goal 4: Pt will ambulate with equal step length and stance time on BLE  Long term goal 5: Pt will improve LEFS by at least 15 in order to demosntrate significant improvement in function     Plan: [] Continue per plan of care [] Alter current plan (see comments)   [x] Plan of care initiated [] Hold pending MD visit [] Discharge      Plan for Next Session:      Re-Certification Due Date:         Signature:   Jaguar Lowe PT

## 2020-10-14 ENCOUNTER — HOSPITAL ENCOUNTER (OUTPATIENT)
Dept: PHYSICAL THERAPY | Age: 50
Setting detail: THERAPIES SERIES
Discharge: HOME | End: 2020-10-14
Payer: COMMERCIAL

## 2020-10-14 NOTE — PROGRESS NOTES
Physical Therapy        Physical Therapy  Cancellation/No-show Note  Patient Name:  Lanie Jaimes  :  1970   Date:  10/14/2020  Cancels to date: 2  No-shows to date: 0    For today's appointment patient:  [x] Cancelled  [x] Rescheduled appointment  [] No-show     Reason given by patient:  [] Patient ill  [] Conflicting appointment  [] No transportation    [] Conflict with work  [] No reason given  [x] Other:     Comments:  Pt reported that she has supposed to have \"people stop by her house at 8:00 AM and they have not arrived yet\". Therefore, she will not be able to make her 9:00 PT appointment.      Electronically signed by:  Ron Estrella PT

## 2020-10-14 NOTE — TELEPHONE ENCOUNTER
Please put in an order for PO PT. Patient is at [facility] today.     L TKR on 9/30 [Chills] : chills [Dry Eyes] : dryness of the eyes [Sore Throat] : sore throat [Abdominal Pain] : abdominal pain [Constipation] : constipation [Heartburn] : heartburn [Seen by urologist before (Name)  ___] : Previously seen by a urologist: [unfilled] [Urine Infection (bladder/kidney)] : bladder/kidney infection [Pain during urination] : pain during urination [History of kidney stones] : history of kidney stones [Urine retention] : urine retention [Wake up at night to urinate  How many times?  ___] : wakes up to urinate [unfilled] times during the night [Strong urge to urinate] : strong urge to urinate [Bladder pressure] : experiences bladder pressure [Strain or push to urinate] : strain or push to urinate [Wait a long time to urinate] : waits a long time to urinate [Slow urine stream] : slow urine stream [Interrupted urine stream] : interrupted urine stream [Bladder fullness after urinating] : bladder fullness after urinating [Joint Pain] : joint pain [Skin Lesions] : skin lesion [Itching] : itching [Difficulty Walking] : difficulty walking [Anxiety] : anxiety [Muscle Weakness] : muscle weakness [Negative] : Heme/Lymph [FreeTextEntry3] : leak urine

## 2020-10-15 ENCOUNTER — VIRTUAL VISIT (OUTPATIENT)
Dept: ORTHOPEDIC SURGERY | Age: 50
End: 2020-10-15

## 2020-10-15 PROCEDURE — 99024 POSTOP FOLLOW-UP VISIT: CPT | Performed by: PHYSICIAN ASSISTANT

## 2020-10-15 NOTE — PROGRESS NOTES
Flip Lizarraga is a 48 y.o. female being evaluated by a Virtual Visit (video visit) encounter to address concerns as mentioned above. A caregiver was present when appropriate. Due to this being a TeleHealth encounter (During IYRBE-00 public health emergency), evaluation of the following organ systems was limited: Vitals/Constitutional/EENT/Resp/CV/GI//MS/Neuro/Skin/Heme-Lymph-Imm. Pursuant to the emergency declaration under the [address] [place] [address] and the [employer] Act, this Virtual Visit was conducted with patient's (and/or legal guardian's) consent, to reduce the patient's risk of exposure to COVID-19 and provide necessary medical care. The patient (and/or legal guardian) has also been advised to contact this office for worsening conditions or problems, and seek emergency medical treatment and/or call 911 if deemed necessary. Services were provided through a video synchronous discussion virtually to substitute for in-person clinic visit. Patient's location was at their home. --Jenna Bryant PA-C on 10/15/2020 at 10:58 AM    An electronic signature was used to authenticate this note. Diagnosis: Status post left total knee replacement    History of present illness: The patient returns today following total knee replacement. The pain has been well controlled on oral analgesics. They have no complaints of constitutional symptoms. Patient is 2 weeks postop. Physical exam: The incision is clean, dry and intact with no drainage. Range of motion is 3 degrees of extension to 90 degrees of flexion. There is expected swelling with no evidence of DVT and a negative Catrachito's sign. Neruovascular exam is intact. Assessment/Plan: We would like to continue outpatient physical therapy to restore range of motion and strength. The patient was given local wound care instructions.  We did not refill their pain medicaion today. We will follow up in 2 weeks for re-evaluation.

## 2020-10-16 ENCOUNTER — HOSPITAL ENCOUNTER (OUTPATIENT)
Dept: PHYSICAL THERAPY | Age: 50
Setting detail: THERAPIES SERIES
Discharge: HOME | End: 2020-10-16
Payer: COMMERCIAL

## 2020-10-16 PROCEDURE — 97140 MANUAL THERAPY 1/> REGIONS: CPT

## 2020-10-16 PROCEDURE — 97110 THERAPEUTIC EXERCISES: CPT

## 2020-10-16 NOTE — FLOWSHEET NOTE
Physical Therapy  . Physical Therapy Daily Treatment Note    Date:  10/16/2020  Patient Name:  Ethan Raymond    :  1970  MRN: [id number]  Restrictions/Precautions:  Knee replacement on    Medical/Treatment Diagnosis Information:  · Diagnosis: L Knee Replacement  Insurance/Certification information:  PT Insurance Information: [address]  Physician Information:  Referring Practitioner: Kirit Shaw MD  Plan of care signed (Y/N):  Y  Visit# / total visits:       G-Code (if applicable):          LEFS: 4780    Time in:   9:05   Timed Treatment: 55 Total Treatment Time:  55 min    ________________________________________________________________________________________    Pain Level:   2 /10  SUBJECTIVE:  Pt had MD visit yesterday. Pt said he reported that everything is looking good. Pt has noticed improvement in pain. Pt is now using crutch to walk     OBJECTIVE:   Ambulated in clinic with crutch with less antalgic gait pattern, improved step length on the R and stance time on the L     Exercise/Equipment Resistance/Repetitions Other comments   Nu-step 5 min To improve      Quad set   Glute set 10\"x15    HEP  HEP     SAQ 5\"x15 HEP     Heel Slide 5\"x20  HEP     Ankle pumps  HEP   LAQ 2\"x15      Heel prop 4 minutes 7#     SLR Flexion AAROM x10      Triple extension    PKF 5\"x10        TKE 5\"x15      Slant board  K flexion stretch at step  3x30  10\"x15\"        HR x15       Mini squat 15                                                              Other Therapeutic Activities:      Manual Treatments:       Scar massage 4 minutes   STM to knee/quad 3 minutes  Passive knee flexion stretch  Total Man 12 min       HEP: [id number]     Modalities:      Test/Measurements:    L knee flexion: 104 degrees       ASSESSMENT:  Pt tolerated TE well with minimal increase in LE discomfort. TE and standing TE progressed this date. Pt achieved 104 degrees on L knee flexion this date. HEP updated.  PT will progress as pt

## 2020-10-19 ENCOUNTER — HOSPITAL ENCOUNTER (OUTPATIENT)
Dept: PHYSICAL THERAPY | Age: 50
Setting detail: THERAPIES SERIES
Discharge: HOME | End: 2020-10-19
Payer: COMMERCIAL

## 2020-10-19 PROCEDURE — 97140 MANUAL THERAPY 1/> REGIONS: CPT

## 2020-10-19 PROCEDURE — 97110 THERAPEUTIC EXERCISES: CPT

## 2020-10-19 NOTE — FLOWSHEET NOTE
Physical Therapy  . Physical Therapy Daily Treatment Note    Date:  10/19/2020  Patient Name:  Morena Valentine    :  1970  MRN: [id number]  Restrictions/Precautions:  Knee replacement on    Medical/Treatment Diagnosis Information:  · Diagnosis: L Knee Replacement  Insurance/Certification information:  PT Insurance Information: Sarah Cole  Physician Information:  Referring Practitioner: Jessica Brennan MD  Plan of care signed (Y/N):  Y  Visit# / total visits:       G-Code (if applicable):          LEFS: 47/80    Time in:   9:00   Timed Treatment: 45 Total Treatment Time:  45 min    ________________________________________________________________________________________    Pain Level:   2 /10  SUBJECTIVE:  Pt said she is doing well and had no big issues over the weekend. Pt said she is still tight in her thigh. OBJECTIVE:   Ambulated in clinic with crutch with less antalgic gait pattern, improved step length on the R and stance time on the L     Exercise/Equipment Resistance/Repetitions Other comments   Nu-step 5 min To improve      Quad set   Glute set 10\"x10    HEP  HEP     SAQ  HEP     Heel Slide 10\"x10  HEP     Ankle pumps  HEP   LAQ 2x2\"x15      Heel prop  7#     SLR Flexion AAROM x10      Triple extension    PKF 5\"x10        TKE 5\"x15      Slant board  K flexion stretch at step  3x30  10\"x15\"        HR x15       Mini squat 3x10      FSU x15      Balance Board 1 min tap ea  1 min bal ea                                                Other Therapeutic Activities:      Manual Treatments:       Scar massage 4 minutes   STM to knee/quad 3 minutes  Passive knee flexion stretch  Smash and grab R quad  Total Man 12 min       HEP: [id number]     Modalities:      Test/Measurements:    L knee flexion: 108 degrees       ASSESSMENT:  Pt tolerated TE well with minimal increase in LE discomfort. TE and standing TE progressed this date. Pt achieved 104 degrees on L knee flexion this date. HEP updated.  PT

## 2020-10-21 ENCOUNTER — TELEPHONE (OUTPATIENT)
Dept: ORTHOPEDIC SURGERY | Age: 50
End: 2020-10-21

## 2020-10-21 ENCOUNTER — HOSPITAL ENCOUNTER (OUTPATIENT)
Dept: PHYSICAL THERAPY | Age: 50
Setting detail: THERAPIES SERIES
Discharge: HOME | End: 2020-10-21
Payer: COMMERCIAL

## 2020-10-21 PROCEDURE — 97110 THERAPEUTIC EXERCISES: CPT

## 2020-10-21 PROCEDURE — 97140 MANUAL THERAPY 1/> REGIONS: CPT

## 2020-10-21 NOTE — FLOWSHEET NOTE
Physical Therapy  . Physical Therapy Daily Treatment Note    Date:  10/21/2020  Patient Name:  Gwen Patino    :  1970  MRN: [id number]  Restrictions/Precautions:  Knee replacement on    Medical/Treatment Diagnosis Information:  · Diagnosis: L Knee Replacement  Insurance/Certification information:  PT Insurance Information: Rick Kay  Physician Information:  Referring Practitioner: Lloyd Cooper MD  Plan of care signed (Y/N):  Y  Visit# / total visits:       G-Code (if applicable):          LEFS: 47/80    Time in:   9:05   Timed Treatment: 40 Total Treatment Time:  45 min    ________________________________________________________________________________________    Pain Level:   2 /10  SUBJECTIVE:  Pt said she is doing well this morning. OBJECTIVE:   Ambulated in clinic with crutch with less antalgic gait pattern, improved step length on the R and stance time on the L     Exercise/Equipment Resistance/Repetitions Other comments   Recumbent Bike  5 min To improve L knee flexion      Quad set   Glute set 10\"x15    HEP  HEP     SAQ  HEP     Heel Slide 10\"x10  HEP     Ankle pumps  HEP   LAQ 2x2\"x15      Heel prop  7#     SLR Flexion  x15      Triple extension    PKF 5\"x10        TKE 5\"x15      Slant board  K flexion stretch at step  3x30  10\"x15\"        HR x15       Mini squat 3x10      FSU x15      Balance Board 1 min tap ea  1 min bal ea      TG press  TG x15  5 min                                          Other Therapeutic Activities:      Manual Treatments:       Scar massage 4 minutes   STM to knee/quad 3 minutes  Passive knee flexion stretch  Smash and grab R quad  Total Man 9 min       HEP: [id number]     Modalities:      Test/Measurements:    L knee flexion: 112 degrees       ASSESSMENT:  Pt tolerated TE well with minimal increase in LE discomfort. TE and standing TE progressed this date. Pt achieved 112 degrees on L knee flexion this date. HEP updated.  PT will progress as pt can tolerate. Treatment/Activity Tolerance:   [x]Patient tolerated treatment well [] Patient limited by fatique  []Patient limited by pain [] Patient limited by other medical complications  [] Other:     Goals:         Long term goals  Time Frame for Long term goals : 6 weeks  Long term goal 1: Pt will be independent with HEP  Long term goal 2: Pt will improve LLE strength to at least 4/5 gross strength  Long term goal 3: Pt will improve L knee ROM to 0-120. Long term goal 4: Pt will ambulate with equal step length and stance time on BLE  Long term goal 5: Pt will improve LEFS by at least 15 in order to demosntrate significant improvement in function     Plan: [] Continue per plan of care [] Alter current plan (see comments)   [x] Plan of care initiated [] Hold pending MD visit [] Discharge      Plan for Next Session:      Re-Certification Due Date:         Signature:   Marko Thakur PT

## 2020-10-21 NOTE — TELEPHONE ENCOUNTER
Attempted to contact pt, left voicemail with purpose and call back number. Sandra Tobias  Orthopedic Nurse Navigator  Phone number: [phone]    Follow up appointments:    Future Appointments   Date Time Provider Salma White   10/23/2020  9:00 AM Evonnie Neither, PT [facility]  [facility]   10/26/2020  9:00 AM Evonnie Neither, PT [facility]  [facility]   10/28/2020  9:00 AM Evonnie Neither, PT [facility]  [facility]   10/30/2020  9:00 AM Evonnie Neither, PT [facility]  [facility]   11/2/2020  9:00 AM Evonnie Neither, PT [facility] PT [address] [place] [address]   11/4/2020  9:00 AM Evonnie Neither, PT [facility]   11/6/2020  9:00 AM Evonnie Neither, PT [facility]     Signed off from pt. Instructed pt to call RN or [facility] office with any issues or concerns.

## 2020-10-23 ENCOUNTER — HOSPITAL ENCOUNTER (OUTPATIENT)
Dept: PHYSICAL THERAPY | Age: 50
Setting detail: THERAPIES SERIES
Discharge: HOME | End: 2020-10-23
Payer: COMMERCIAL

## 2020-10-23 PROCEDURE — 97110 THERAPEUTIC EXERCISES: CPT

## 2020-10-23 PROCEDURE — 97140 MANUAL THERAPY 1/> REGIONS: CPT

## 2020-10-23 NOTE — FLOWSHEET NOTE
Physical Therapy  . Physical Therapy Daily Treatment Note    Date:  10/23/2020  Patient Name:  Heather Dale    :  1970  MRN: [id number]  Restrictions/Precautions:  Knee replacement on    Medical/Treatment Diagnosis Information:  · Diagnosis: L Knee Replacement  Insurance/Certification information:  PT Insurance Information: Emelia Salazar  Physician Information:  Referring Practitioner: Blanca Cochran MD  Plan of care signed (Y/N):  Y  Visit# / total visits:       G-Code (if applicable):          LEFS: 47/80    Time in:   9:00   Timed Treatment: 45 Total Treatment Time:  45 min    ________________________________________________________________________________________    Pain Level:   2 /10  SUBJECTIVE:  Pt said her knee is a little harder to move this morning. OBJECTIVE:   Ambulated in clinic with crutch with less antalgic gait pattern, improved step length on the R and stance time on the L     Exercise/Equipment Resistance/Repetitions Other comments   Recumbent Bike  5 min To improve L knee flexion      Quad set   Glute set 10\"x15    HEP  HEP     SAQ  HEP     Heel Slide 10\"x10  HEP     Ankle pumps  HEP   LAQ 2x2\"x15      Heel prop  7#     SLR Flexion  x15      Triple extension    PKF 5\"x10        TKE 5\"x15      Slant board  K flexion stretch at step  3x30  10\"x15\"        HR        Mini squat x15      FSU x15 6\"      Balance Board     Treadmill walking 5 min       TG press  TG x15  5 min                                          Other Therapeutic Activities:      Manual Treatments:       Scar massage 2 minutes   STM to knee/quad 3 minutes  Passive knee flexion stretch  Knee Extension stretch   Smash and grab R quad  Total Man 14 min       HEP: [id number]     Modalities:      Test/Measurements:    L knee flexion: 112 degrees       ASSESSMENT:  Pt tolerated TE well with minimal increase in LE discomfort. TE and standing TE progressed this date. Pt achieved 112 degrees on L knee flexion this date. HEP updated. PT will progress as pt can tolerate. Treatment/Activity Tolerance:   [x]Patient tolerated treatment well [] Patient limited by fatique  []Patient limited by pain [] Patient limited by other medical complications  [] Other:     Goals:         Long term goals  Time Frame for Long term goals : 6 weeks  Long term goal 1: Pt will be independent with HEP  Long term goal 2: Pt will improve LLE strength to at least 4/5 gross strength  Long term goal 3: Pt will improve L knee ROM to 0-120. Long term goal 4: Pt will ambulate with equal step length and stance time on BLE  Long term goal 5: Pt will improve LEFS by at least 15 in order to demosntrate significant improvement in function     Plan: [] Continue per plan of care [] Alter current plan (see comments)   [x] Plan of care initiated [] Hold pending MD visit [] Discharge      Plan for Next Session:      Re-Certification Due Date:         Signature:   Channing Miner , PT

## 2020-10-26 ENCOUNTER — HOSPITAL ENCOUNTER (OUTPATIENT)
Dept: PHYSICAL THERAPY | Age: 50
Setting detail: THERAPIES SERIES
Discharge: HOME | End: 2020-10-26
Payer: COMMERCIAL

## 2020-10-26 PROCEDURE — 97140 MANUAL THERAPY 1/> REGIONS: CPT

## 2020-10-26 PROCEDURE — 97110 THERAPEUTIC EXERCISES: CPT

## 2020-10-28 ENCOUNTER — HOSPITAL ENCOUNTER (OUTPATIENT)
Dept: PHYSICAL THERAPY | Age: 50
Setting detail: THERAPIES SERIES
Discharge: HOME | End: 2020-10-28
Payer: COMMERCIAL

## 2020-10-28 NOTE — PROGRESS NOTES
Physical Therapy        Physical Therapy  Cancellation/No-show Note  Patient Name:  Kwasi Goldberg  :  1970   Date:  10/28/2020  Cancels to date: 3  No-shows to date: 0    For today's appointment patient:  [x] Cancelled  [x] Rescheduled appointment  [] No-show     Reason given by patient:  [] Patient ill  [] Conflicting appointment  [] No transportation    [] Conflict with work  [x] No reason given \"something came up\"  [] Other:     Comments:      Electronically signed by:  Abner Benton PT

## 2020-10-30 ENCOUNTER — HOSPITAL ENCOUNTER (OUTPATIENT)
Dept: PHYSICAL THERAPY | Age: 50
Setting detail: THERAPIES SERIES
Discharge: HOME | End: 2020-10-30
Payer: COMMERCIAL

## 2020-10-30 PROCEDURE — 97110 THERAPEUTIC EXERCISES: CPT

## 2020-10-30 PROCEDURE — 97140 MANUAL THERAPY 1/> REGIONS: CPT

## 2020-10-30 NOTE — FLOWSHEET NOTE
focus on quad strengthening and knee extension nv    Treatment/Activity Tolerance:   [x]Patient tolerated treatment well [] Patient limited by fatique  []Patient limited by pain [] Patient limited by other medical complications  [] Other:     Goals:         Long term goals  Time Frame for Long term goals : 6 weeks  Long term goal 1: Pt will be independent with HEP  Long term goal 2: Pt will improve LLE strength to at least 4/5 gross strength  Long term goal 3: Pt will improve L knee ROM to 0-120. Long term goal 4: Pt will ambulate with equal step length and stance time on BLE  Long term goal 5: Pt will improve LEFS by at least 15 in order to demosntrate significant improvement in function     Plan: [] Continue per plan of care [] Alter current plan (see comments)   [x] Plan of care initiated [] Hold pending MD visit [] Discharge      Plan for Next Session:      Re-Certification Due Date:         Signature:   Channing Miner , PT

## 2020-11-02 ENCOUNTER — HOSPITAL ENCOUNTER (OUTPATIENT)
Dept: PHYSICAL THERAPY | Age: 50
Setting detail: THERAPIES SERIES
Discharge: HOME | End: 2020-11-02
Payer: COMMERCIAL

## 2020-11-02 PROCEDURE — 97110 THERAPEUTIC EXERCISES: CPT

## 2020-11-02 PROCEDURE — 97140 MANUAL THERAPY 1/> REGIONS: CPT

## 2020-11-02 NOTE — FLOWSHEET NOTE
Physical Therapy  . Physical Therapy Daily Treatment Note    Date:  2020  Patient Name:  Jaret Milan    :  1970  MRN: [id number]  Restrictions/Precautions:  Knee replacement on    Medical/Treatment Diagnosis Information:  · Diagnosis: L Knee Replacement  Insurance/Certification information:  PT Insurance Information: Rachel Sommers  Physician Information:  Referring Practitioner: Elsa Iyer MD  Plan of care signed (Y/N):  Y  Visit# / total visits:  10/12     G-Code (if applicable):          LEFS: 47    Time in:   9:00   Timed Treatment: 45 Total Treatment Time:  45 min    ________________________________________________________________________________________    Pain Level:   0-1 /10  SUBJECTIVE:  Pt said she felt very good over the weekend. OBJECTIVE:   Ambulated in clinic with no AD. Less antalgic gait pattern, improved step length on the R and stance time on the L     Exercise/Equipment Resistance/Repetitions Other comments   Recumbent Bike  5 min To improve L knee flexion      Quad set   Glute set 10\"x10    HEP  HEP     SAQ  HEP     Heel Slide  HEP     Ankle pumps  HEP   LAQ 2\"x15 c       Heel prop  7#     SLR Flexion  x15      Triple extension    PKF 5\"x20        TKE 5\"x20      Slant board  K flexion stretch at step  3x30  10\"x15\"        HR        Mini squat x15      FSU  Step up and over x15 6\"  x15 R 6\"       Balance Board 1 min tap ea  1 min bal ea    Retro Treadmill walking 3 min  0.8-.09 mph      TG press  TG   5 min                                          Other Therapeutic Activities:      Manual Treatments:       Scar massage 2 minutes   STM to knee/quad 3 minutes  Knee Extension stretch   Smash and grab R quad  Total Man 8 min       HEP: [id number]     Modalities:      Test/Measurements:    L knee flexion: 124 degrees       ASSESSMENT:  Pt tolerated TE well with minimal increase in LE discomfort. TE and standing TE progressed this date.   Pt achieved 124 degrees on L knee

## 2020-11-03 ENCOUNTER — OFFICE VISIT (OUTPATIENT)
Dept: ORTHOPEDIC SURGERY | Age: 50
End: 2020-11-03

## 2020-11-03 PROCEDURE — 99024 POSTOP FOLLOW-UP VISIT: CPT | Performed by: PHYSICIAN ASSISTANT

## 2020-11-03 NOTE — PROGRESS NOTES
Diagnosis: Status post left total knee replacement    History of present illness: The patient returns today following total knee replacement. The pain has been well controlled on oral analgesics. They have no complaints of constitutional symptoms. Pain Assessment  Location of Pain: Knee  Location Modifiers: Left  Severity of Pain: 1  Quality of Pain: Dull  Duration of Pain: Persistent  Frequency of Pain: Intermittent  Aggravating Factors: Walking, Standing  Limiting Behavior: No  Relieving Factors: Rest, Ice, Nsaids]    Physical exam: The incision is clean, dry and intact with no drainage. Range of motion is 0 degrees of extension to 120 degrees of flexion. There is expected swelling with no evidence of DVT and a negative Catrachito's sign. Neruovascular exam is intact. X-rays ordered today reviewed of the left knee. 3 views. Standing AP, lateral, and skyline views. They demonstrate a left total knee arthroplasty in good position. No evidence of loosening or periprosthetic fracture. Assessment/Plan: We would like to begin outpatient physical therapy to restore range of motion and strength. The patient was given local wound care instructions. We did not refill their pain medicaion today. Patient will follow-up 1 year from surgery or sooner if problems arise. We discussed predental and preinvasive procedure antibiotics. We have discussed appropriate types of exercise.

## 2020-11-04 ENCOUNTER — HOSPITAL ENCOUNTER (OUTPATIENT)
Dept: PHYSICAL THERAPY | Age: 50
Setting detail: THERAPIES SERIES
Discharge: HOME | End: 2020-11-04
Payer: COMMERCIAL

## 2020-11-04 NOTE — PROGRESS NOTES
Physical Therapy        Physical Therapy  Cancellation/No-show Note  Patient Name:  Gladis Singh  :  1970   Date:  2020  Cancels to date: 4  No-shows to date: 0    For today's appointment patient:  [x] Cancelled  [x] Rescheduled appointment  [] No-show     Reason given by patient:  [x] Patient ill  [] Conflicting appointment  [] No transportation    [] Conflict with work  [] No reason given   [] Other:     Comments:      Electronically signed by:  Ellyn Rucker PT

## 2020-11-06 ENCOUNTER — HOSPITAL ENCOUNTER (OUTPATIENT)
Dept: PHYSICAL THERAPY | Age: 50
Setting detail: THERAPIES SERIES
Discharge: HOME | End: 2020-11-06
Payer: COMMERCIAL

## 2020-11-06 NOTE — PROGRESS NOTES
Physical Therapy        Physical Therapy  Cancellation/No-show Note  Patient Name:  Gladis Singh  :  1970   Date:  2020  Cancels to date: 5  No-shows to date: 0    For today's appointment patient:  [x] Cancelled  [x] Rescheduled appointment  [] No-show     Reason given by patient:  [] Patient ill  [] Conflicting appointment  [] No transportation    [] Conflict with work  [] No reason given   [x] Other:     Comments:  Pt called and reported that she has kids at home under quarantine and will not be able to make her 9:00 appointment today. Pt reported that she will call next week to reschedule.      Electronically signed by:  Ellyn Rucker PT

## (undated) DEVICE — PEEL-AWAY TOGA, 2X LARGE: Brand: FLYTE

## (undated) DEVICE — BLADE SURG SAW S STL NAR OSC W/ SERR EDGE DISP

## (undated) DEVICE — PIN FIX L170MM DIA4MM BNE SELF DRL

## (undated) DEVICE — STERILE PVP: Brand: MEDLINE INDUSTRIES, INC.

## (undated) DEVICE — KIT INT FIX FEM TIB CKPT MAKOPLASTY

## (undated) DEVICE — DRESSING FOAM W4XL12IN SIL RECT ADH WTRPRF FLM BK W/ BORD

## (undated) DEVICE — SOLUTION IRRIG 2000ML 0.9% SOD CHL USP UROMATIC PLAS CONT

## (undated) DEVICE — SURGICAL PROCEDURE PACK RESTORIS MCK CAPMAKOPFJ] MAKO]

## (undated) DEVICE — 35 ML SYRINGE LUER-LOCK TIP: Brand: MONOJECT

## (undated) DEVICE — GLOVE ORANGE PI 8 1/2   MSG9085

## (undated) DEVICE — Device

## (undated) DEVICE — PIN FIX L140MM DIA4MM BNE

## (undated) DEVICE — PIN BONE FIX L110MM DIA3.2MM

## (undated) DEVICE — NEEDLE SPNL L3.5IN PNK HUB S STL REG WALL FIT STYL W/ QNCKE

## (undated) DEVICE — HANDPIECE SET WITH HIGH FLOW TIP AND SUCTION TUBE: Brand: INTERPULSE

## (undated) DEVICE — BLADE SURG SAW STD S STL OSC W/ SERR EDGE DISP

## (undated) DEVICE — CORD RETRCT SIL

## (undated) DEVICE — HOOD, PEEL-AWAY: Brand: FLYTE

## (undated) DEVICE — PENCIL SMK EVAC ALL IN 1 DSGN ENH VISIBILITY IMPROVED AIR

## (undated) DEVICE — KIT DRP FOR RIO ROBOTIC ARM ASST SYS

## (undated) DEVICE — OPTIFOAM GENTLE SA, POSTOP, 4X12: Brand: MEDLINE

## (undated) DEVICE — KIT TRK KNEE PROC VIZADISC

## (undated) DEVICE — 19 IN KNEE IMMOBILIZER: Brand: DEROYAL

## (undated) DEVICE — BOOT POS LEG DEMAYO

## (undated) DEVICE — SOLUTION IV IRRIG POUR BRL 0.9% SODIUM CHL 2F7124

## (undated) DEVICE — PIN BNE FIX L140MM DIA3.2MM SELF DRL

## (undated) DEVICE — GLOVE SURG SZ 65 THK91MIL LTX FREE SYN POLYISOPRENE

## (undated) DEVICE — ADHESIVE SKIN CLSR 0.7ML TOP DERMBND ADV

## (undated) DEVICE — GLOVE ORANGE PI 8   MSG9080